# Patient Record
Sex: MALE | Race: ASIAN | NOT HISPANIC OR LATINO | URBAN - METROPOLITAN AREA
[De-identification: names, ages, dates, MRNs, and addresses within clinical notes are randomized per-mention and may not be internally consistent; named-entity substitution may affect disease eponyms.]

---

## 2021-10-28 ENCOUNTER — INPATIENT (INPATIENT)
Facility: HOSPITAL | Age: 86
LOS: 8 days | Discharge: NURSING, REHAB OR LONG-TERM CARE | DRG: 522 | End: 2021-11-06
Attending: ORTHOPAEDIC SURGERY | Admitting: ORTHOPAEDIC SURGERY
Payer: MEDICARE

## 2021-10-28 ENCOUNTER — TRANSCRIPTION ENCOUNTER (OUTPATIENT)
Age: 86
End: 2021-10-28

## 2021-10-28 VITALS
RESPIRATION RATE: 18 BRPM | TEMPERATURE: 98 F | OXYGEN SATURATION: 92 % | HEART RATE: 79 BPM | DIASTOLIC BLOOD PRESSURE: 66 MMHG | HEIGHT: 63 IN | WEIGHT: 149.91 LBS | SYSTOLIC BLOOD PRESSURE: 181 MMHG

## 2021-10-28 LAB
ALBUMIN SERPL ELPH-MCNC: 3.2 G/DL — LOW (ref 3.4–5)
ALP SERPL-CCNC: 157 U/L — HIGH (ref 40–120)
ALT FLD-CCNC: 32 U/L — SIGNIFICANT CHANGE UP (ref 12–42)
ANION GAP SERPL CALC-SCNC: 7 MMOL/L — LOW (ref 9–16)
AST SERPL-CCNC: 40 U/L — HIGH (ref 15–37)
BASOPHILS # BLD AUTO: 0.02 K/UL — SIGNIFICANT CHANGE UP (ref 0–0.2)
BASOPHILS NFR BLD AUTO: 0.2 % — SIGNIFICANT CHANGE UP (ref 0–2)
BILIRUB SERPL-MCNC: 0.6 MG/DL — SIGNIFICANT CHANGE UP (ref 0.2–1.2)
BUN SERPL-MCNC: 35 MG/DL — HIGH (ref 7–23)
CALCIUM SERPL-MCNC: 8.7 MG/DL — SIGNIFICANT CHANGE UP (ref 8.5–10.5)
CHLORIDE SERPL-SCNC: 102 MMOL/L — SIGNIFICANT CHANGE UP (ref 96–108)
CO2 SERPL-SCNC: 25 MMOL/L — SIGNIFICANT CHANGE UP (ref 22–31)
CREAT SERPL-MCNC: 1.89 MG/DL — HIGH (ref 0.5–1.3)
EOSINOPHIL # BLD AUTO: 0.03 K/UL — SIGNIFICANT CHANGE UP (ref 0–0.5)
EOSINOPHIL NFR BLD AUTO: 0.3 % — SIGNIFICANT CHANGE UP (ref 0–6)
GLUCOSE SERPL-MCNC: 155 MG/DL — HIGH (ref 70–99)
HCT VFR BLD CALC: 29.2 % — LOW (ref 39–50)
HGB BLD-MCNC: 9.7 G/DL — LOW (ref 13–17)
IMM GRANULOCYTES NFR BLD AUTO: 0.4 % — SIGNIFICANT CHANGE UP (ref 0–1.5)
LYMPHOCYTES # BLD AUTO: 0.96 K/UL — LOW (ref 1–3.3)
LYMPHOCYTES # BLD AUTO: 8.4 % — LOW (ref 13–44)
MACROCYTES BLD QL: SIGNIFICANT CHANGE UP
MANUAL SMEAR VERIFICATION: SIGNIFICANT CHANGE UP
MCHC RBC-ENTMCNC: 33.2 GM/DL — SIGNIFICANT CHANGE UP (ref 32–36)
MCHC RBC-ENTMCNC: 34.8 PG — HIGH (ref 27–34)
MCV RBC AUTO: 104.7 FL — HIGH (ref 80–100)
MONOCYTES # BLD AUTO: 0.44 K/UL — SIGNIFICANT CHANGE UP (ref 0–0.9)
MONOCYTES NFR BLD AUTO: 3.8 % — SIGNIFICANT CHANGE UP (ref 2–14)
NEUTROPHILS # BLD AUTO: 9.99 K/UL — HIGH (ref 1.8–7.4)
NEUTROPHILS NFR BLD AUTO: 86.9 % — HIGH (ref 43–77)
NRBC # BLD: 0 /100 WBCS — SIGNIFICANT CHANGE UP (ref 0–0)
PLAT MORPH BLD: NORMAL — SIGNIFICANT CHANGE UP
PLATELET # BLD AUTO: 135 K/UL — LOW (ref 150–400)
POTASSIUM SERPL-MCNC: 5 MMOL/L — SIGNIFICANT CHANGE UP (ref 3.5–5.3)
POTASSIUM SERPL-SCNC: 5 MMOL/L — SIGNIFICANT CHANGE UP (ref 3.5–5.3)
PROT SERPL-MCNC: 7.1 G/DL — SIGNIFICANT CHANGE UP (ref 6.4–8.2)
RBC # BLD: 2.79 M/UL — LOW (ref 4.2–5.8)
RBC # FLD: 12.8 % — SIGNIFICANT CHANGE UP (ref 10.3–14.5)
RBC BLD AUTO: ABNORMAL
SARS-COV-2 RNA SPEC QL NAA+PROBE: SIGNIFICANT CHANGE UP
SODIUM SERPL-SCNC: 134 MMOL/L — SIGNIFICANT CHANGE UP (ref 132–145)
WBC # BLD: 11.49 K/UL — HIGH (ref 3.8–10.5)
WBC # FLD AUTO: 11.49 K/UL — HIGH (ref 3.8–10.5)

## 2021-10-28 PROCEDURE — 72125 CT NECK SPINE W/O DYE: CPT | Mod: 26,MH

## 2021-10-28 PROCEDURE — 73552 X-RAY EXAM OF FEMUR 2/>: CPT | Mod: 26,LT

## 2021-10-28 PROCEDURE — 99285 EMERGENCY DEPT VISIT HI MDM: CPT

## 2021-10-28 PROCEDURE — 74176 CT ABD & PELVIS W/O CONTRAST: CPT | Mod: 26,MH

## 2021-10-28 PROCEDURE — 73502 X-RAY EXAM HIP UNI 2-3 VIEWS: CPT | Mod: 26,LT

## 2021-10-28 PROCEDURE — 71250 CT THORAX DX C-: CPT | Mod: 26,MH

## 2021-10-28 PROCEDURE — 70450 CT HEAD/BRAIN W/O DYE: CPT | Mod: 26,MH

## 2021-10-28 RX ORDER — ACETAMINOPHEN 500 MG
650 TABLET ORAL ONCE
Refills: 0 | Status: COMPLETED | OUTPATIENT
Start: 2021-10-28 | End: 2021-10-28

## 2021-10-28 RX ADMIN — Medication 650 MILLIGRAM(S): at 22:00

## 2021-10-28 NOTE — ED ADULT NURSE NOTE - OBJECTIVE STATEMENT
Patient to ED with L sided leg pain s/p mechanical trip and fall just prior to arrival. Daughter states she was assisting Pt as he was trying on new slippers when he lost his balance and fell onto his L side. No LOC or use of blood thinners. Daughter was unable to help Pt stand back up and she called EMS.

## 2021-10-28 NOTE — ED PROVIDER NOTE - MUSCULOSKELETAL, MLM
Tenderness to palpation to the L hip and L femur. FROM of the L hip and knee. No gross deformities. Tenderness to palpation to the L hip and L femur. L leg shortened and externally rotated.  mildly decreased ROM of L hip.  Full ROM of L knee.

## 2021-10-28 NOTE — ED PROVIDER NOTE - OBJECTIVE STATEMENT
89 y/o M with PMH of hypertension presents to the ED for L sided leg pain s/p mechanical trip and fall just prior to arrival. Daughter states she was assisting Pt as he was trying on new slippers when he lost his balance and fell onto his L side. No LOC or use of blood thinners. Daughter was unable to help Pt stand back up and she called EMS. Pt states he has "too much pain to stand on the L leg." No vomiting after the injury. Pt denies CP, SOB, or Abd pain.

## 2021-10-28 NOTE — ED PROVIDER NOTE - CLINICAL SUMMARY MEDICAL DECISION MAKING FREE TEXT BOX
89 y/o M with Hx of hypertension presents to the ED s/p mechanical trip and fall without LOC. On exam, Pt noted to have an SpO2 of 92% on room air. Lungs are clear B/L. No respiratory distress. There is Tenderness to palpation over the L hip and femur. Will check CT head and C-spine to r/o head trauma and CT chest to evaluate for unexplained hypoxia in the setting of trauma. Will check CT Abd/pelvis and XR L hip/femur. Will treat pain and reassess clinically after results have been obtained. 89 y/o M with Hx of hypertension presents to the ED s/p mechanical trip and fall without LOC. On exam, Pt noted to have an SpO2 of 92% on room air. Lungs are clear B/L. No respiratory distress. There is Tenderness to palpation over the L hip and femur. Will check CT head and C-spine to r/o head trauma and CT chest to evaluate for unexplained hypoxia in the setting of trauma. Will check CT Abd/pelvis and XR L hip/femur. Will treat pain and reassess clinically after results have been obtained.    Imaging shows L femoral neck fracture.  Pt NPO in ED.    No other acute injuries noted on CT head/ c-spine/ chest/ abd/ pelvis.  Pt now satting 95-96% on RA.    Pt is A&Ox3, able to make decisions for his own care.  In the event that pt is unable to make his own decisions, son Reynaldo (who lives in [place]) is next of kin.  I spoke with pt, his two daughters at bedside, his son Reynaldo on the phone, and his great nephew (who is an ER doctor in [place]) on FaceTime and discussed results and plan to transfer pt up to [facility] for likely surgery by ortho.  Pt is amenable to surgery.

## 2021-10-28 NOTE — ED PROVIDER NOTE - CONSTITUTIONAL, MLM
normal... Elderly male with resting tremors; Awake, alert, oriented to person, place, time/situation and in no apparent distress.

## 2021-10-28 NOTE — ED PROVIDER NOTE - ATTESTATION, MLM
110 I have reviewed and confirmed nurses' notes for patient's medications, allergies, medical history, and surgical history.

## 2021-10-29 ENCOUNTER — RESULT REVIEW (OUTPATIENT)
Age: 86
End: 2021-10-29

## 2021-10-29 LAB
APPEARANCE UR: CLEAR — SIGNIFICANT CHANGE UP
BACTERIA # UR AUTO: PRESENT /HPF
BILIRUB UR-MCNC: NEGATIVE — SIGNIFICANT CHANGE UP
BLD GP AB SCN SERPL QL: NEGATIVE — SIGNIFICANT CHANGE UP
BLD GP AB SCN SERPL QL: NEGATIVE — SIGNIFICANT CHANGE UP
COLOR SPEC: YELLOW — SIGNIFICANT CHANGE UP
COVID-19 SPIKE DOMAIN AB INTERP: POSITIVE
COVID-19 SPIKE DOMAIN ANTIBODY RESULT: >250 U/ML — HIGH
CREAT ?TM UR-MCNC: 83 MG/DL — SIGNIFICANT CHANGE UP
DIFF PNL FLD: ABNORMAL
EPI CELLS # UR: ABNORMAL /HPF (ref 0–5)
FOLATE SERPL-MCNC: >20 NG/ML — SIGNIFICANT CHANGE UP
GLUCOSE UR QL: NEGATIVE — SIGNIFICANT CHANGE UP
KETONES UR-MCNC: NEGATIVE — SIGNIFICANT CHANGE UP
LEUKOCYTE ESTERASE UR-ACNC: NEGATIVE — SIGNIFICANT CHANGE UP
NITRITE UR-MCNC: NEGATIVE — SIGNIFICANT CHANGE UP
PH UR: 6 — SIGNIFICANT CHANGE UP (ref 5–8)
PROT UR-MCNC: ABNORMAL MG/DL
RBC # BLD: 2.57 M/UL — LOW (ref 4.2–5.8)
RBC CASTS # UR COMP ASSIST: < 5 /HPF — SIGNIFICANT CHANGE UP
RETICS #: 53.2 K/UL — SIGNIFICANT CHANGE UP (ref 25–125)
RETICS/RBC NFR: 2.1 % — SIGNIFICANT CHANGE UP (ref 0.5–2.5)
RH IG SCN BLD-IMP: POSITIVE — SIGNIFICANT CHANGE UP
RH IG SCN BLD-IMP: POSITIVE — SIGNIFICANT CHANGE UP
SARS-COV-2 IGG+IGM SERPL QL IA: >250 U/ML — HIGH
SARS-COV-2 IGG+IGM SERPL QL IA: POSITIVE
SODIUM UR-SCNC: 80 MMOL/L — SIGNIFICANT CHANGE UP
SP GR SPEC: 1.02 — SIGNIFICANT CHANGE UP (ref 1–1.03)
TSH SERPL-MCNC: 1.61 UIU/ML — SIGNIFICANT CHANGE UP (ref 0.27–4.2)
UROBILINOGEN FLD QL: 0.2 E.U./DL — SIGNIFICANT CHANGE UP
VIT B12 SERPL-MCNC: >2000 PG/ML — HIGH (ref 232–1245)
WBC UR QL: < 5 /HPF — SIGNIFICANT CHANGE UP

## 2021-10-29 PROCEDURE — 93306 TTE W/DOPPLER COMPLETE: CPT | Mod: 26

## 2021-10-29 PROCEDURE — 93010 ELECTROCARDIOGRAM REPORT: CPT

## 2021-10-29 PROCEDURE — 72170 X-RAY EXAM OF PELVIS: CPT | Mod: 26

## 2021-10-29 PROCEDURE — 99221 1ST HOSP IP/OBS SF/LOW 40: CPT

## 2021-10-29 PROCEDURE — 71045 X-RAY EXAM CHEST 1 VIEW: CPT | Mod: 26

## 2021-10-29 PROCEDURE — 72170 X-RAY EXAM OF PELVIS: CPT | Mod: 26,77

## 2021-10-29 PROCEDURE — 88300 SURGICAL PATH GROSS: CPT | Mod: 26

## 2021-10-29 PROCEDURE — 99223 1ST HOSP IP/OBS HIGH 75: CPT

## 2021-10-29 RX ORDER — ATORVASTATIN CALCIUM 80 MG/1
20 TABLET, FILM COATED ORAL AT BEDTIME
Refills: 0 | Status: DISCONTINUED | OUTPATIENT
Start: 2021-10-29 | End: 2021-11-06

## 2021-10-29 RX ORDER — HYDROMORPHONE HYDROCHLORIDE 2 MG/ML
0.5 INJECTION INTRAMUSCULAR; INTRAVENOUS; SUBCUTANEOUS EVERY 6 HOURS
Refills: 0 | Status: DISCONTINUED | OUTPATIENT
Start: 2021-10-29 | End: 2021-11-02

## 2021-10-29 RX ORDER — SENNA PLUS 8.6 MG/1
2 TABLET ORAL AT BEDTIME
Refills: 0 | Status: DISCONTINUED | OUTPATIENT
Start: 2021-10-29 | End: 2021-11-06

## 2021-10-29 RX ORDER — OXYCODONE HYDROCHLORIDE 5 MG/1
5 TABLET ORAL EVERY 4 HOURS
Refills: 0 | Status: DISCONTINUED | OUTPATIENT
Start: 2021-10-29 | End: 2021-10-29

## 2021-10-29 RX ORDER — CEFAZOLIN SODIUM 1 G
2000 VIAL (EA) INJECTION EVERY 8 HOURS
Refills: 0 | Status: COMPLETED | OUTPATIENT
Start: 2021-10-30 | End: 2021-10-30

## 2021-10-29 RX ORDER — ACETAMINOPHEN 500 MG
975 TABLET ORAL EVERY 8 HOURS
Refills: 0 | Status: DISCONTINUED | OUTPATIENT
Start: 2021-10-29 | End: 2021-11-06

## 2021-10-29 RX ORDER — LANOLIN ALCOHOL/MO/W.PET/CERES
3 CREAM (GRAM) TOPICAL AT BEDTIME
Refills: 0 | Status: DISCONTINUED | OUTPATIENT
Start: 2021-10-29 | End: 2021-11-06

## 2021-10-29 RX ORDER — SODIUM CHLORIDE 9 MG/ML
1000 INJECTION, SOLUTION INTRAVENOUS
Refills: 0 | Status: DISCONTINUED | OUTPATIENT
Start: 2021-10-30 | End: 2021-10-31

## 2021-10-29 RX ORDER — PREGABALIN 225 MG/1
1 CAPSULE ORAL
Qty: 0 | Refills: 0 | DISCHARGE

## 2021-10-29 RX ORDER — FINASTERIDE 5 MG/1
5 TABLET, FILM COATED ORAL DAILY
Refills: 0 | Status: DISCONTINUED | OUTPATIENT
Start: 2021-10-29 | End: 2021-11-06

## 2021-10-29 RX ORDER — OLMESARTAN MEDOXOMIL 5 MG/1
1 TABLET, FILM COATED ORAL
Qty: 0 | Refills: 0 | DISCHARGE

## 2021-10-29 RX ORDER — PANTOPRAZOLE SODIUM 20 MG/1
1 TABLET, DELAYED RELEASE ORAL
Qty: 0 | Refills: 0 | DISCHARGE

## 2021-10-29 RX ORDER — TAMSULOSIN HYDROCHLORIDE 0.4 MG/1
0.4 CAPSULE ORAL AT BEDTIME
Refills: 0 | Status: DISCONTINUED | OUTPATIENT
Start: 2021-10-29 | End: 2021-11-06

## 2021-10-29 RX ORDER — LOSARTAN POTASSIUM 100 MG/1
100 TABLET, FILM COATED ORAL DAILY
Refills: 0 | Status: DISCONTINUED | OUTPATIENT
Start: 2021-10-29 | End: 2021-11-06

## 2021-10-29 RX ORDER — AMLODIPINE BESYLATE 2.5 MG/1
2.5 TABLET ORAL DAILY
Refills: 0 | Status: DISCONTINUED | OUTPATIENT
Start: 2021-10-29 | End: 2021-11-01

## 2021-10-29 RX ORDER — CHLORHEXIDINE GLUCONATE 213 G/1000ML
1 SOLUTION TOPICAL EVERY 12 HOURS
Refills: 0 | Status: COMPLETED | OUTPATIENT
Start: 2021-10-29 | End: 2021-10-29

## 2021-10-29 RX ORDER — ALLOPURINOL 300 MG
100 TABLET ORAL DAILY
Refills: 0 | Status: DISCONTINUED | OUTPATIENT
Start: 2021-10-29 | End: 2021-11-06

## 2021-10-29 RX ORDER — POVIDONE-IODINE 5 %
1 AEROSOL (ML) TOPICAL ONCE
Refills: 0 | Status: COMPLETED | OUTPATIENT
Start: 2021-10-29 | End: 2021-10-29

## 2021-10-29 RX ORDER — MAGNESIUM HYDROXIDE 400 MG/1
30 TABLET, CHEWABLE ORAL DAILY
Refills: 0 | Status: DISCONTINUED | OUTPATIENT
Start: 2021-10-29 | End: 2021-11-06

## 2021-10-29 RX ORDER — FOLIC ACID 0.8 MG
1 TABLET ORAL
Qty: 0 | Refills: 0 | DISCHARGE

## 2021-10-29 RX ORDER — PANTOPRAZOLE SODIUM 20 MG/1
40 TABLET, DELAYED RELEASE ORAL
Refills: 0 | Status: DISCONTINUED | OUTPATIENT
Start: 2021-10-29 | End: 2021-11-06

## 2021-10-29 RX ORDER — SODIUM CHLORIDE 9 MG/ML
1000 INJECTION INTRAMUSCULAR; INTRAVENOUS; SUBCUTANEOUS
Refills: 0 | Status: DISCONTINUED | OUTPATIENT
Start: 2021-10-29 | End: 2021-10-31

## 2021-10-29 RX ORDER — FERROUS SULFATE 325(65) MG
1 TABLET ORAL
Qty: 0 | Refills: 0 | DISCHARGE

## 2021-10-29 RX ADMIN — CHLORHEXIDINE GLUCONATE 1 APPLICATION(S): 213 SOLUTION TOPICAL at 06:37

## 2021-10-29 RX ADMIN — CHLORHEXIDINE GLUCONATE 1 APPLICATION(S): 213 SOLUTION TOPICAL at 16:37

## 2021-10-29 RX ADMIN — Medication 1 APPLICATION(S): at 16:34

## 2021-10-29 RX ADMIN — LOSARTAN POTASSIUM 100 MILLIGRAM(S): 100 TABLET, FILM COATED ORAL at 06:31

## 2021-10-29 RX ADMIN — PANTOPRAZOLE SODIUM 40 MILLIGRAM(S): 20 TABLET, DELAYED RELEASE ORAL at 06:31

## 2021-10-29 RX ADMIN — SODIUM CHLORIDE 120 MILLILITER(S): 9 INJECTION INTRAMUSCULAR; INTRAVENOUS; SUBCUTANEOUS at 01:23

## 2021-10-29 RX ADMIN — AMLODIPINE BESYLATE 2.5 MILLIGRAM(S): 2.5 TABLET ORAL at 06:31

## 2021-10-29 NOTE — CONSULT NOTE ADULT - ASSESSMENT
89 M with HTN, CKD, BPH, gout admitted for mechanical fall with L hip fracture. Medicine consulted for pre-operative assessment.    #L hip fracture  No LOC or prodrome suggestive of cardiac or neurologic etiology for fall, probably mechanical. Patient completes 2 METS at home at baseline, has tolerated surgery in the past. May have some cardiac sequelae of longstanding HTN but no overt signs of heart failure.  - Patient is optimized for surgery with close PCP follow-up    #HTN  181/66 improved to 166/64, wise pulse pressure likely correlating with aortic calcifications. Per daughter, he "is not a complainer" so likely some element of pain.  - c/w amlodipine 2.5mg daily, if hypertensive post-op with adequate pain control can increase to 5mg vs temporize with hydralazine 10mg PO once for SBP>180  - outpt follow-up of cardiomegaly    #CKD  Cr 1.89, borderline SWAPNIL on CKD status but not meeting criteria at this time. UA without casts, SG 1.02 less suggestive of prerenal SWAPNIL. FENa 1.4% suggestive of baseline CKD.  - caution with use of CT w/ IV contrast  - OK to use gentle maintenance fluids if pt is NPO, but avoid large volume bolus (1L or greater) unless hemodynamically unstable    #Anemia  Hgb 9.7, may be baseline, but per daughter no Hx of anemia and on no supplements. Macrocytic and hypoproliferative suggest B12, folate, TSH, alcohol, or MDS. Daughter reports no EtOH.  - f/u B12, folate, TSH  - If otherwise negative can f/u outpatient  - maintain active T&S, transfuse for Hgb<7.0 or Hgb<9 with hemodynamic changes and suspicion of active bleeding    Medicine comanagement will follow. Recs not finalized until attending attestation below 89 M with HTN, CKD, BPH, gout admitted for mechanical fall with L hip fracture. Medicine consulted for pre-operative assessment.    #L hip fracture  No LOC or prodrome suggestive of cardiac or neurologic etiology for fall, probably mechanical. Patient completes 2 METS at home at baseline, has tolerated surgery in the past. May have some cardiac sequelae of longstanding HTN but no overt signs of heart failure.  - Recommend cardiology consultation before patient can be fully optimized for surgery  - RCRI 0  - Maciel 1.1% risk    #HTN  181/66 improved to 166/64, wise pulse pressure likely correlating with aortic calcifications. Per daughter, he "is not a complainer" so likely some element of pain.  - c/w amlodipine 2.5mg daily, if hypertensive post-op with adequate pain control can increase to 5mg vs temporize with hydralazine 10mg PO once for SBP>180  - recommend cardiology consult to determine if echocardiogram is indicated given CT evidence of pHTN, cardiomegaly, and RBBB on EKG.    #CKD  Cr 1.89, borderline SWAPNIL on CKD status but not meeting criteria at this time. UA without casts, SG 1.02 less suggestive of prerenal SWAPNIL. FENa 1.4% suggestive of baseline CKD.  - caution with use of CT w/ IV contrast  - OK to use gentle maintenance fluids if pt is NPO, but avoid large volume bolus (1L or greater) unless hemodynamically unstable    #Anemia  Hgb 9.7, may be baseline, but per daughter no Hx of anemia and on no supplements. Macrocytic and hypoproliferative suggest B12, folate, TSH, alcohol, or MDS. Daughter reports no EtOH.  - f/u B12, folate, TSH  - iron panel, TIBC, ferritin for next routine labs  - If otherwise negative can f/u outpatient  - maintain active T&S, transfuse for Hgb<7.0 or Hgb<9 with hemodynamic changes and suspicion of active bleeding    Medicine comanagement will follow. Recs not finalized until attending attestation below

## 2021-10-29 NOTE — CONSULT NOTE ADULT - ATTENDING COMMENTS
Patient seen and examined in AM. Daughters at bedside serving as Cantonese .    89M w h/o CKD (1.6), BPH, HTN, Gout, cholelithiasis w procedures at [facility] previously, p/w mechanical fall onto L side found to have L FNF - for OR w Dr. Alicea - co-management called for pre-op evaluation    Pt ambulates using a cane at baseline in his apartment. Lives w daughter and has part time HHA who prepares breakfast and lunch. Pt has not gone outside since pandemic began except to doctor's visits (uses WC then). Pt otherwise is A/O x3 at baseline and reads the Chinese newspaper daily. Denies h/o cardiac history, CVA, DM. Exam shows male in NAD on RA, RRR, CTAB wo rales, rhonchi, wheezing, abd soft, NABS, non-tender, Decreased strength in L hip and knee 2/2 pain.     #Pre-op evaluation - RCRI Class I risk; SIMONS 1.1% risk of ALLISON. METS: 2  EKG:   #HTN - above target. Suspect pain from trauma contributing  #CKDIII - Cr 1.89 (1.6 baseline)  #HLD - c/w home statin  #Macrocytic anemia - 9.7  #BPH - on finasteride and flomax at home    Recommendations  Discussed with daughters that based on patient's baseline function status, he is likely going to need sub acute rehab after surgery. They also understand cardiology to is evaluate prior to surgery.  -Pt is intermediate risk for intermediate risk procedure. Due to METS <4 agree with cardiology eval to complete pre-operative evaluation  -Caution w NSAIDs post op due to CKD  -Once farmer removed would perform q6h bladder scans in setting of BPH  -F/u B12, Folate, TSH    DISPO: TBD pending cardiac pre-op evaluation and possible OR. Patient seen and examined in AM. Daughters at bedside serving as Cantonese .    89M w h/o CKD (1.6), BPH, HTN, Gout, cholelithiasis w procedures at [facility] previously, p/w mechanical fall onto L side found to have L FNF - for OR w Dr. Alicea - co-management called for pre-op evaluation    Pt ambulates using a cane at baseline in his apartment. Lives w daughter and has part time HHA who prepares breakfast and lunch. Pt has not gone outside since pandemic began except to doctor's visits (uses WC then). Pt otherwise is A/O x3 at baseline and reads the Chinese newspaper daily. Denies h/o cardiac history, CVA, DM. Exam shows male in NAD on RA, RRR, CTAB wo rales, rhonchi, wheezing, abd soft, NABS, non-tender, Decreased strength in L hip and knee 2/2 pain.     #Pre-op evaluation - RCRI Class I risk; SIMONS 1.1% risk of ALLISON. METS: 2  #HTN - above target. Suspect pain from trauma contributing  #CKDIII - Cr 1.89 (1.6 baseline)  #HLD - c/w home statin  #Macrocytic anemia - 9.7  #BPH - on finasteride and flomax at home    Recommendations  Discussed with daughters that based on patient's baseline function status, he is likely going to need sub acute rehab after surgery. They also understand cardiology to is evaluate prior to surgery.  -Pt is intermediate risk for intermediate risk procedure. Due to METS <4 agree with cardiology eval to complete pre-operative evaluation  -Caution w NSAIDs post op due to CKD  -Once farmer removed would perform q6h bladder scans in setting of BPH  -F/u B12, Folate, TSH    DISPO: TBD pending cardiac pre-op evaluation and possible OR.

## 2021-10-29 NOTE — H&P ADULT - NSHPLABSRESULTS_GEN_ALL_CORE
Vital Signs Last 24 Hrs  T(C): 37.2 (28 Oct 2021 23:15), Max: 37.2 (28 Oct 2021 22:12)  T(F): 98.9 (28 Oct 2021 23:15), Max: 99 (28 Oct 2021 22:12)  HR: 89 (28 Oct 2021 23:15) (79 - 94)  BP: 166/64 (28 Oct 2021 23:15) (166/64 - 181/66)  BP(mean): --  RR: 18 (28 Oct 2021 23:15) (18 - 18)  SpO2: 92% (28 Oct 2021 23:15) (92% - 95%)      Labs:                        9.7    11.49 )-----------( 135      ( 28 Oct 2021 17:31 )             29.2     10-28    134  |  102  |  35<H>  ----------------------------<  155<H>  5.0   |  25  |  1.89<H>    Ca    8.7      28 Oct 2021 17:31    TPro  7.1  /  Alb  3.2<L>  /  TBili  0.6  /  DBili  x   /  AST  40<H>  /  ALT  32  /  AlkPhos  157<H>  10-28        Imaging:     Xray L hip: L completely displaced femoral neck fracture

## 2021-10-29 NOTE — CONSULT NOTE ADULT - SUBJECTIVE AND OBJECTIVE BOX
89 M with PMHx HTN, BPH, CKD with b/l Cr 1.6 presented with daughter to [facility] 10/28 s/p mechanical fall while trying on slippers without LOC. Patient was in his usual state of health without prodrome; but had weakness after fall and was unable to sit up with assistance of daughter so brought to ED.    ED Course: 36.8, 79, 181/66, 18, 92% RA  Tylenol 650mg  XR: completely displaced L femoral neck fracture per ortho service  CXR:   Admit to orthopedic service for  89 M with PMHx HTN, BPH, gout, CKD with b/l Cr 1.6 presented with daughter to [facility] 10/28 s/p mechanical fall while trying on slippers without LOC. Patient was in his usual state of health without prodrome; but had weakness after fall and was unable to sit up with assistance of daughter so brought to ED.    ED Course: 36.8, 79, 181/66, 18, 92% RA  Tylenol 650mg  Labs notable for WBC 11.5, Hgb 9.7 with .7 with RPI 1.5%  XR: completely displaced L femoral neck fracture per ortho service  CXR: unremarkable  CT C/A/P: cardiomegaly, aortic calcification, pulmonary artery dilation  Admit to orthopedic service for operative management of L hip fracture    Further history per daughter at bedside: in usual state of health prior to fall, had slight head trauma but primarily landed on hip. At baseline A&Ox3, ambulates with cane in the home and wheelchair when out of the house. Baseline urinary frequency with low output volumes on tamsulosin and finasteride. Takes amlodipine, olmesartan, atorvastatin at home. Also takes Chinese herbs every day, one of which is similar to but not the same as Gingko.  SHx notable for a colon surgery 40 years ago in the [place], daughter unsure what it was for. He also had ERCP to retrieve a gallstone 3/2020 and a few days later needed a laparoscopic surgery (NOT cholecystectomy) that she thinks might be a lysis of adhesions. He was fully anesthetized and she thinks he was intubated for it but wasn't sure; no complications. Last gout flare several years ago.  No bleeding or clotting Hx, no cardiac history. His PCP was incidentally a cardiologist prior to his current PCP (Jillian Wetzel [phone]). Had TTE done many years ago and was normal, nothing done recently.    Per primary team, grandson (daughter at bedside's nephew) is ED physician who gave med list and reports baseline Cr is 1.6    Per daughter: ROS positive for hip pain, 12-point ROS otherwise negative    VITAL SIGNS:  T(F): 98.9 (10-28-21 @ 23:15)  HR: 89 (10-28-21 @ 23:15)  BP: 166/64 (10-28-21 @ 23:15)  RR: 18 (10-28-21 @ 23:15)  SpO2: 92% (10-28-21 @ 23:15)  Wt(kg): --      10-28-21 @ 07:01  -  10-29-21 @ 03:53  --------------------------------------------------------  IN: 600 mL / OUT: 150 mL / NET: 450 mL        PHYSICAL EXAM:    Constitutional: elderly man resting comfortably in bed; NAD  HEENT: NC/AT, PER, anicteric sclera, no nasal discharge; MMM; missing several teeth  Respiratory: CTA B/L; no W/R/R, no retractions  Cardiac: +S1/S2; RRR; no M/R/G; +S4 heard, 1 PVC noted during 15s auscultation  Gastrointestinal: soft, NT/ND; no rebound or guarding  Extremities: WWP, no clubbing or cyanosis; no peripheral edema  Vascular: 2+ radial, DP/PT pulses B/L  Dermatologic: skin warm, dry and intact; no rashes, wounds, or scars  Neurologic: AAOx3 per daughter; 5/5 strength b/l arms with slight intentional tremor; sensation intact in b/l legs  Psychiatric: unable to obtain    MEDICATIONS  (STANDING):  allopurinol 100 milliGRAM(s) Oral daily  amLODIPine   Tablet 2.5 milliGRAM(s) Oral daily  atorvastatin 20 milliGRAM(s) Oral at bedtime  chlorhexidine 2% Cloths 1 Application(s) Topical every 12 hours  finasteride 5 milliGRAM(s) Oral daily  losartan 100 milliGRAM(s) Oral daily  pantoprazole    Tablet 40 milliGRAM(s) Oral before breakfast  povidone iodine 5% Nasal Swab 1 Application(s) Both Nostrils once  senna 2 Tablet(s) Oral at bedtime  sodium chloride 0.9%. 1000 milliLiter(s) (120 mL/Hr) IV Continuous <Continuous>  tamsulosin 0.4 milliGRAM(s) Oral at bedtime    MEDICATIONS  (PRN):  magnesium hydroxide Suspension 30 milliLiter(s) Oral daily PRN Constipation  melatonin 3 milliGRAM(s) Oral at bedtime PRN Insomnia  oxyCODONE    IR 5 milliGRAM(s) Oral every 4 hours PRN Moderate Pain (4 - 6)      Allergies    No Known Allergies    Intolerances        LABS:                        9.7    11.49 )-----------( 135      ( 28 Oct 2021 17:31 )             29.2     10-28    134  |  102  |  35<H>  ----------------------------<  155<H>  5.0   |  25  |  1.89<H>    Ca    8.7      28 Oct 2021 17:31    TPro  7.1  /  Alb  3.2<L>  /  TBili  0.6  /  DBili  x   /  AST  40<H>  /  ALT  32  /  AlkPhos  157<H>  10-28      Urinalysis Basic - ( 29 Oct 2021 01:33 )    Color: Yellow / Appearance: Clear / S.020 / pH: x  Gluc: x / Ketone: NEGATIVE  / Bili: Negative / Urobili: 0.2 E.U./dL   Blood: x / Protein: Trace mg/dL / Nitrite: NEGATIVE   Leuk Esterase: NEGATIVE / RBC: < 5 /HPF / WBC < 5 /HPF   Sq Epi: x / Non Sq Epi: 5-10 /HPF / Bacteria: Present /HPF        RADIOLOGY & ADDITIONAL TESTS:  Reviewed

## 2021-10-29 NOTE — CONSULT NOTE ADULT - ATTENDING COMMENTS
Initial attending contact date  10/29/21    . See fellow note written above for details. I reviewed the fellow documentation. I have personally seen and examined this patient. I reviewed vitals, labs, medications, cardiac studies, and additional imaging. I agree with the above fellow's findings and plans as written above with the following additions/statements.       89M w/HTN, CKD, BPH, gout, possible TIA per the family adm to ortho on 10/29 for mechanical fall w/L hip fracture. Cardiology consulted for preop assessment.    -Pt with limited functional capacity but denies anginal equivalents  -EKG NSR without ischemic changes  -On exam with 3/6 JAQUELINE suggestive of moderate AS  -Would obtain echo for eval, but does not have to be done prior to OR  -Pt considered intermediate risk for intermediate risk procedure  -Will fu post op

## 2021-10-29 NOTE — CONSULT NOTE ADULT - SUBJECTIVE AND OBJECTIVE BOX
HPI:  Orthopaedic Surgery Consult Note    Attending Physician: Dr. Alicea  Consult requested by: ED    CC: L hip pain    HPI:  89yMale with HTN and CKD (baseline Cr 1.6) who presents with a L femoral neck fracture status post mechanical fall. Pt slipped while trying on slippers and landed on his left side. Denies LOC, head strike. Denies using blood thinners. Pt was transferred from [facility] for preoperative evaluation. Denies numbness/tingling of extremities.               (29 Oct 2021 01:02)    The patient was admitted to . The patient was seen and examined at bedside.    ROS: A 10-point review of systems was otherwise negative.    PAST MEDICAL & SURGICAL HISTORY:  HTN (hypertension)    Chronic kidney disease, unspecified CKD stage        SOCIAL HISTORY:  FAMILY HISTORY:      ALLERGIES: 	  No Known Allergies            MEDICATIONS:  allopurinol 100 milliGRAM(s) Oral daily  amLODIPine   Tablet 2.5 milliGRAM(s) Oral daily  atorvastatin 20 milliGRAM(s) Oral at bedtime  chlorhexidine 2% Cloths 1 Application(s) Topical every 12 hours  finasteride 5 milliGRAM(s) Oral daily  losartan 100 milliGRAM(s) Oral daily  magnesium hydroxide Suspension 30 milliLiter(s) Oral daily PRN  melatonin 3 milliGRAM(s) Oral at bedtime PRN  oxyCODONE    IR 5 milliGRAM(s) Oral every 4 hours PRN  pantoprazole    Tablet 40 milliGRAM(s) Oral before breakfast  povidone iodine 5% Nasal Swab 1 Application(s) Both Nostrils once  senna 2 Tablet(s) Oral at bedtime  sodium chloride 0.9%. 1000 milliLiter(s) IV Continuous <Continuous>  tamsulosin 0.4 milliGRAM(s) Oral at bedtime      PHYSICAL EXAM:  T(C): 37.1 (10-29-21 @ 04:30), Max: 37.2 (10-28-21 @ 22:12)  HR: 76 (10-29-21 @ 04:30) (76 - 94)  BP: 166/61 (10-29-21 @ 04:30) (166/61 - 181/66)  RR: 15 (10-29-21 @ 04:30) (15 - 18)  SpO2: 93% (10-29-21 @ 04:30) (92% - 95%)  Wt(kg): --    10-28-21 @ 07:01  -  10-29-21 @ 07:00  --------------------------------------------------------  IN: 600 mL / OUT: 150 mL / NET: 450 mL        GEN: Awake, comfortable. NAD.   HEENT: NCAT, PERRL, EOMI. Mucosa moist.   NECK: Supple, no JVD.   RESP: CTA b/l  CV: RRR, normal s1/s2. No m/r/g.  ABD: Soft, NTND. BS+  EXT: Warm. No edema, clubbing, or cyanosis.   NEURO: AAOx3. No focal deficits.    I&O's Summary    28 Oct 2021 07:01  -  29 Oct 2021 07:00  --------------------------------------------------------  IN: 600 mL / OUT: 150 mL / NET: 450 mL      Height (cm): 160 (10-29 @ 04:03)  Weight (kg): 68 (10-29 @ 04:03)  BMI (kg/m2): 26.6 (10-29 @ 04:03)  BSA (m2): 1.71 (10-29 @ 04:03)  	  LABS:	 	    CARDIAC MARKERS:                                  9.7    11.49 )-----------( 135      ( 28 Oct 2021 17:31 )             29.2     10-28    134  |  102  |  35<H>  ----------------------------<  155<H>  5.0   |  25  |  1.89<H>    Ca    8.7      28 Oct 2021 17:31    TPro  7.1  /  Alb  3.2<L>  /  TBili  0.6  /  DBili  x   /  AST  40<H>  /  ALT  32  /  AlkPhos  157<H>  10-28    proBNP:   Lipid Profile:   HgA1c:   TSH: Thyroid Stimulating Hormone, Serum: 1.610 uIU/mL (10-29 @ 02:11)      TELEMETRY: 	    ECG:  	  RADIOLOGY:   ECHO:  STRESS:  CATH:   HPI:  Orthopaedic Surgery Consult Note    Attending Physician: Dr. Alicea  Consult requested by: ED    CC: L hip pain    HPI:  89yMale with HTN and CKD (baseline Cr 1.6) who presents with a L femoral neck fracture status post mechanical fall. Pt slipped while trying on slippers and landed on his left side. Denies LOC, head strike. Denies using blood thinners. Pt was transferred from [facility] for preoperative evaluation. Denies numbness/tingling of extremities.               (29 Oct 2021 01:02)    The patient was admitted to [facility] for management of L hip fracture. Cardiology consulted for preop evaluation The patient was seen and examined at bedside with his daughters at bedside. States he lives with them and is able to walk around the house without any chest pain or SOB. Does not really leave the house. No complaints of chest pain, paliptations or dyspnea. No LE swelling. Has undergone two surgical procedures in the last 1.5 years without any issue    ROS: A 10-point review of systems was otherwise negative.    PAST MEDICAL & SURGICAL HISTORY:  HTN (hypertension)    Chronic kidney disease, unspecified CKD stage        SOCIAL HISTORY:  FAMILY HISTORY:      ALLERGIES: 	  No Known Allergies            MEDICATIONS:  allopurinol 100 milliGRAM(s) Oral daily  amLODIPine   Tablet 2.5 milliGRAM(s) Oral daily  atorvastatin 20 milliGRAM(s) Oral at bedtime  chlorhexidine 2% Cloths 1 Application(s) Topical every 12 hours  finasteride 5 milliGRAM(s) Oral daily  losartan 100 milliGRAM(s) Oral daily  magnesium hydroxide Suspension 30 milliLiter(s) Oral daily PRN  melatonin 3 milliGRAM(s) Oral at bedtime PRN  oxyCODONE    IR 5 milliGRAM(s) Oral every 4 hours PRN  pantoprazole    Tablet 40 milliGRAM(s) Oral before breakfast  povidone iodine 5% Nasal Swab 1 Application(s) Both Nostrils once  senna 2 Tablet(s) Oral at bedtime  sodium chloride 0.9%. 1000 milliLiter(s) IV Continuous <Continuous>  tamsulosin 0.4 milliGRAM(s) Oral at bedtime      PHYSICAL EXAM:  T(C): 37.1 (10-29-21 @ 04:30), Max: 37.2 (10-28-21 @ 22:12)  HR: 76 (10-29-21 @ 04:30) (76 - 94)  BP: 166/61 (10-29-21 @ 04:30) (166/61 - 181/66)  RR: 15 (10-29-21 @ 04:30) (15 - 18)  SpO2: 93% (10-29-21 @ 04:30) (92% - 95%)  Wt(kg): --    10-28-21 @ 07:01  -  10-29-21 @ 07:00  --------------------------------------------------------  IN: 600 mL / OUT: 150 mL / NET: 450 mL        GEN: Awake, comfortable. NAD.   HEENT: NCAT, PERRL, EOMI. Mucosa moist.   NECK: Supple, no JVD.   RESP: CTA b/l  CV: RRR, normal s1/s2. 3/6 JAQUELINE w/radiation to carotids and axilla.  ABD: Soft, NTND. BS+  EXT: Warm. No edema, clubbing, or cyanosis.   NEURO: AAOx3. No focal deficits.    I&O's Summary    28 Oct 2021 07:01  -  29 Oct 2021 07:00  --------------------------------------------------------  IN: 600 mL / OUT: 150 mL / NET: 450 mL      Height (cm): 160 (10-29 @ 04:03)  Weight (kg): 68 (10-29 @ 04:03)  BMI (kg/m2): 26.6 (10-29 @ 04:03)  BSA (m2): 1.71 (10-29 @ 04:03)  	  LABS:	 	    CARDIAC MARKERS:                                  9.7    11.49 )-----------( 135      ( 28 Oct 2021 17:31 )             29.2     10-28    134  |  102  |  35<H>  ----------------------------<  155<H>  5.0   |  25  |  1.89<H>    Ca    8.7      28 Oct 2021 17:31    TPro  7.1  /  Alb  3.2<L>  /  TBili  0.6  /  DBili  x   /  AST  40<H>  /  ALT  32  /  AlkPhos  157<H>  10-28    proBNP:   Lipid Profile:   HgA1c:   TSH: Thyroid Stimulating Hormone, Serum: 1.610 uIU/mL (10-29 @ 02:11)      TELEMETRY: 	    ECG:  	  RADIOLOGY:   ECHO:  STRESS:  CATH:

## 2021-10-29 NOTE — CONSULT NOTE ADULT - ASSESSMENT
A/P: 89M w/HTN, CKD, BPH, gout, possible TIA per the vamily adm to ortho on 10/29 for mechanical fall w/L hip fracture. Cardiology consulted for preop assessment.    #Preop - RCRI - 0-1 (depending on stroke history) w/METS < 4. Maciel - 1.07% chance of perioperative MI or cardiac arrest.  - no further cardiac testing required prior to planned surgery    #Murmur  - obtain TTE; does not need to be done prior to the procedure.    Recommendations are final when signed by attending.    --  Alex Lopez MD  Cardiology PGY6

## 2021-10-29 NOTE — H&P ADULT - NSHPPHYSICALEXAM_GEN_ALL_CORE
Physical Exam:  General: Pt Alert and oriented, NAD  LLE shortened and externally rotated, skin intact  Pulses: 2+dp, pt pulses, toes wwp, cap refill <3 seconds  Sensation: SILT sural/saph/sp/dp/ tibial distributions  Motor: EHL/FHL/TA/GS 5/5

## 2021-10-29 NOTE — H&P ADULT - ASSESSMENT
1/30/2019      Re:   Etelvina Fair  [address] [address]                        This is to certify that Etelvina Fair was seen at [facility].      RESTRICTIONS: Off of work 1-30-19, and 1-31-19.          SIGNATURE:___________________________________________,   1/30/2019      Moy Hanson MD          [facility]  [address] [id number]    
A/P: 89yMale with HTN, CKD (baseline Cr 1.6) who presents with L FNF pending OR for L hip martina.  - stable  - pain control  - pre op labs  - CXR, EKG  - medicine clearance  - NPO for OR today  - consented  - PT, WBS: NWB  - Dispo: pending OR  - Discussed with Attending, Dr. Nixon Lemos, PGY-1  Ortho Pager [phone]

## 2021-10-29 NOTE — PROGRESS NOTE ADULT - SUBJECTIVE AND OBJECTIVE BOX
Ortho Note    Subjective:  Pt comfortable without complaints. Interpretation provided by daughter at bedside. Patient NPO for OR.   Denies CP, SOB, N/V, numbness/tingling     Vital Signs Last 24 Hrs  T(C): 37 (10-29-21 @ 13:59), Max: 37.1 (10-29-21 @ 10:00)  T(F): 98.6 (10-29-21 @ 13:59), Max: 98.8 (10-29-21 @ 10:00)  HR: 92 (10-29-21 @ 13:59) (82 - 92)  BP: 153/66 (10-29-21 @ 13:59) (153/66 - 160/60)  BP(mean): --  RR: 20 (10-29-21 @ 13:59) (17 - 20)  SpO2: 94% (10-29-21 @ 13:59) (93% - 94%)  AVSS    Objective:    Physical Exam:  General: Pt Alert and oriented, NAD  DSG C/D/I  Pulses:  Sensation: silt intact bialteral lower extremities  Motor: EHL/FHL/TA/GS- firing        Plan of Care:  A/P: 89yMale s/p Left mechanical fall with garden 4 FNF, for OR today for possible left hip martina   - afebrile, nontoxic appearance  - Pain Control- oxycodone 5mg PO Q4h Prn moderate pain   - DVT ppx: held for OR  - PT, WBS: bedrest for OR  - appreciate medicine recs  - appreciate cardiology recs  - NPo for OR   - NS @ 120ml/hour  - echo as per cardiolgoy recs  - DIspo- OR pending medical and cardiac clearance for Left Hip martina    Ortho Pager [phone]

## 2021-10-29 NOTE — H&P ADULT - ATTENDING COMMENTS
Contacted by Dr. Alicea to take over care of the patient  Pt. seen/ examined  No additional injuries  Discussed risks/ benefits/ alternatives of surgery with patient/ family  Consent obtained, proceeded to OR for an uneventful L hip hemiarthroplasty

## 2021-10-29 NOTE — H&P ADULT - HISTORY OF PRESENT ILLNESS
Orthopaedic Surgery Consult Note    Attending Physician: Dr. Alicea  Consult requested by: ED    CC: L hip pain    HPI:  89yMale with HTN and CKD (baseline Cr 1.6) who presents with a L femoral neck fracture status post mechanical fall. Pt slipped while trying on slippers and landed on his left side. Denies LOC, head strike. Denies using blood thinners. Pt was transferred from [facility] for preoperative evaluation. Denies numbness/tingling of extremities.

## 2021-10-30 LAB
ANION GAP SERPL CALC-SCNC: 7 MMOL/L — SIGNIFICANT CHANGE UP (ref 5–17)
BUN SERPL-MCNC: 30 MG/DL — HIGH (ref 7–23)
CALCIUM SERPL-MCNC: 8.2 MG/DL — LOW (ref 8.4–10.5)
CHLORIDE SERPL-SCNC: 104 MMOL/L — SIGNIFICANT CHANGE UP (ref 96–108)
CO2 SERPL-SCNC: 23 MMOL/L — SIGNIFICANT CHANGE UP (ref 22–31)
CREAT SERPL-MCNC: 1.81 MG/DL — HIGH (ref 0.5–1.3)
GLUCOSE SERPL-MCNC: 154 MG/DL — HIGH (ref 70–99)
HCT VFR BLD CALC: 22.4 % — LOW (ref 39–50)
HCT VFR BLD CALC: 25.1 % — LOW (ref 39–50)
HGB BLD-MCNC: 7.4 G/DL — LOW (ref 13–17)
HGB BLD-MCNC: 8.3 G/DL — LOW (ref 13–17)
MCHC RBC-ENTMCNC: 32.9 PG — SIGNIFICANT CHANGE UP (ref 27–34)
MCHC RBC-ENTMCNC: 33 GM/DL — SIGNIFICANT CHANGE UP (ref 32–36)
MCHC RBC-ENTMCNC: 33.1 GM/DL — SIGNIFICANT CHANGE UP (ref 32–36)
MCHC RBC-ENTMCNC: 34.9 PG — HIGH (ref 27–34)
MCV RBC AUTO: 105.7 FL — HIGH (ref 80–100)
MCV RBC AUTO: 99.6 FL — SIGNIFICANT CHANGE UP (ref 80–100)
NRBC # BLD: 0 /100 WBCS — SIGNIFICANT CHANGE UP (ref 0–0)
NRBC # BLD: 0 /100 WBCS — SIGNIFICANT CHANGE UP (ref 0–0)
PLATELET # BLD AUTO: 102 K/UL — LOW (ref 150–400)
PLATELET # BLD AUTO: 96 K/UL — LOW (ref 150–400)
POTASSIUM SERPL-MCNC: 4.8 MMOL/L — SIGNIFICANT CHANGE UP (ref 3.5–5.3)
POTASSIUM SERPL-SCNC: 4.8 MMOL/L — SIGNIFICANT CHANGE UP (ref 3.5–5.3)
RBC # BLD: 2.12 M/UL — LOW (ref 4.2–5.8)
RBC # BLD: 2.52 M/UL — LOW (ref 4.2–5.8)
RBC # FLD: 13 % — SIGNIFICANT CHANGE UP (ref 10.3–14.5)
RBC # FLD: 15.1 % — HIGH (ref 10.3–14.5)
SODIUM SERPL-SCNC: 134 MMOL/L — LOW (ref 135–145)
WBC # BLD: 10.96 K/UL — HIGH (ref 3.8–10.5)
WBC # BLD: 9.74 K/UL — SIGNIFICANT CHANGE UP (ref 3.8–10.5)
WBC # FLD AUTO: 10.96 K/UL — HIGH (ref 3.8–10.5)
WBC # FLD AUTO: 9.74 K/UL — SIGNIFICANT CHANGE UP (ref 3.8–10.5)

## 2021-10-30 PROCEDURE — 99233 SBSQ HOSP IP/OBS HIGH 50: CPT

## 2021-10-30 RX ORDER — ASPIRIN/CALCIUM CARB/MAGNESIUM 324 MG
325 TABLET ORAL
Refills: 0 | Status: DISCONTINUED | OUTPATIENT
Start: 2021-10-30 | End: 2021-11-06

## 2021-10-30 RX ADMIN — SODIUM CHLORIDE 100 MILLILITER(S): 9 INJECTION, SOLUTION INTRAVENOUS at 07:23

## 2021-10-30 RX ADMIN — Medication 100 MILLIGRAM(S): at 22:09

## 2021-10-30 RX ADMIN — Medication 975 MILLIGRAM(S): at 22:08

## 2021-10-30 RX ADMIN — Medication 975 MILLIGRAM(S): at 23:00

## 2021-10-30 RX ADMIN — Medication 975 MILLIGRAM(S): at 15:31

## 2021-10-30 RX ADMIN — Medication 975 MILLIGRAM(S): at 09:08

## 2021-10-30 RX ADMIN — PANTOPRAZOLE SODIUM 40 MILLIGRAM(S): 20 TABLET, DELAYED RELEASE ORAL at 07:23

## 2021-10-30 RX ADMIN — Medication 975 MILLIGRAM(S): at 07:20

## 2021-10-30 RX ADMIN — Medication 100 MILLIGRAM(S): at 07:12

## 2021-10-30 RX ADMIN — LOSARTAN POTASSIUM 100 MILLIGRAM(S): 100 TABLET, FILM COATED ORAL at 07:20

## 2021-10-30 RX ADMIN — AMLODIPINE BESYLATE 2.5 MILLIGRAM(S): 2.5 TABLET ORAL at 07:22

## 2021-10-30 RX ADMIN — ATORVASTATIN CALCIUM 20 MILLIGRAM(S): 80 TABLET, FILM COATED ORAL at 22:08

## 2021-10-30 RX ADMIN — Medication 100 MILLIGRAM(S): at 15:00

## 2021-10-30 RX ADMIN — SENNA PLUS 2 TABLET(S): 8.6 TABLET ORAL at 22:09

## 2021-10-30 RX ADMIN — TAMSULOSIN HYDROCHLORIDE 0.4 MILLIGRAM(S): 0.4 CAPSULE ORAL at 22:08

## 2021-10-30 RX ADMIN — Medication 325 MILLIGRAM(S): at 15:00

## 2021-10-30 RX ADMIN — Medication 975 MILLIGRAM(S): at 15:00

## 2021-10-30 NOTE — PROGRESS NOTE ADULT - ASSESSMENT
89M w h/o CKD (1.6), BPH, HTN, Gout, cholelithiasis w procedures at [facility] previously, p/w mechanical fall onto L side found to have L FNF - s/p L Hemiarthroplasty 10/29 w Dr. Mayorga    #Post-op state. Pain controlled. On  BID for ppx. On bowel regimen and incentive spirometer  #HTN - 130-160s. Continue home BP regimen  #Blood loss anemia - 7.4 from 9.7 on baseline. See below  #Thrombocytopenia - 102 from 135. Continue to monitor  #hyponatremia - 134 - stable.  #CKDIII - Cr stable 1.1 from 1.89 (1.6 baseline)  #HLD - c/w home statin  #BPH - on finasteride and flomax at home    Recommendations  Overall was able to walk to door w PT today wo symptoms. Discussed HPT vs [facility]EL w family - noted this will depend on patient progression and level of assistance pt will need.   -Agree w 1u RBC this AM. Likely OR and losses from traumatic injury. Pt does not appear symptomatic  -Due to decreased UOP would continue IVF w NS in setting of hyponatremia.  -Caution w NSAIDs post op due to CKD  -Once farmer removed would perform q6h bladder scans in setting of BPH    DISPO: [facility] vs HPT pending progress. Anticipate DC Mon-Tue

## 2021-10-30 NOTE — PHYSICAL THERAPY INITIAL EVALUATION ADULT - GAIT DEVIATIONS NOTED, PT EVAL
decreased arleen/increased time in double stance/decreased step length/decreased stride length/decreased weight-shifting ability

## 2021-10-30 NOTE — PROGRESS NOTE ADULT - SUBJECTIVE AND OBJECTIVE BOX
Ortho Note    Pt seen and examined at bedside this AM, comfortable without complaints, pain controlled. Denies CP, SOB, N/V, numbness/tingling     Vital Signs Last 24 Hrs  T(C): --  T(F): --  HR: --  BP: --  BP(mean): --  RR: --  SpO2: --  AVSS    General: Pt Alert and oriented, NAD  L hip aquacell DSG C/D/I  Sensation intact s/s/sp/dp/t and symmetric   Motor: EHL/FHL/TA/GS firing   2+ DP pulse  Toes WWP      A/P: 89yMale s/p L hip martina 10/29  - Stable  - Pain Control  - DVT ppx: ASA  - PT, WBS: WBAT  - Low Hgb, transfused 1 unit pRBC  - f/u 6pm CBC  - Dispo planning likely rehab     Ortho Pager [phone]

## 2021-10-30 NOTE — PACU DISCHARGE NOTE - COMMENTS
Patient is hemodynamically stable and exhibits no signs of discomfort.  Meets PACU discharge criteria.  Report given to unit RN.  Patient transported via bed-monitored.  Accompanied by RNx2

## 2021-10-30 NOTE — PHYSICAL THERAPY INITIAL EVALUATION ADULT - ADDITIONAL COMMENTS
Pt's daughter at bedside to translate, daughter states he lives with her, he ambulates with SC, she has a WC for long distances, she works from home now but will have to go back to office soon, she hired a [employer]HA 4hrs per day for 5 days. Pt would like to take pt home if possible

## 2021-10-30 NOTE — PROGRESS NOTE ADULT - SUBJECTIVE AND OBJECTIVE BOX
INTERVAL HPI/OVERNIGHT EVENTS:    SUBJECTIVE: Patient seen and examined at bedside.   Daughters and grandson (ED Physician) at bedside  Pt appears more energetic today. States pain in hip is controlled. Eating and drinking wo nausea, abd pain. No numbness. Denies fever, chest pain, dyspnea. +Flatus wo BM. Voiding wo dysuria. Has intermittent dry cough  Has had little uop since TOV. Bladder scan showed 400cc    OBJECTIVE:    VITAL SIGNS:  ICU Vital Signs Last 24 Hrs  T(C): 37 (30 Oct 2021 17:09), Max: 37.2 (29 Oct 2021 23:00)  T(F): 98.6 (30 Oct 2021 17:09), Max: 98.9 (29 Oct 2021 23:00)  HR: 74 (30 Oct 2021 17:09) (74 - 86)  BP: 148/60 (30 Oct 2021 17:09) (134/60 - 183/75)  BP(mean): 89 (29 Oct 2021 23:59) (87 - 98)  ABP: --  ABP(mean): --  RR: 14 (30 Oct 2021 17:09) (11 - 21)  SpO2: 98% (30 Oct 2021 17:09) (96% - 100%)        10-29 @ 07:01  -  10-30 @ 07:00  --------------------------------------------------------  IN: 580 mL / OUT: 600 mL / NET: -20 mL      CAPILLARY BLOOD GLUCOSE          PHYSICAL EXAM:  GEN: Male in NAD on RA  HEENT: NC/AT, MMM  CV: RRR, no murmurs  PULM: Nml effort, CTAB wo rales, rhonchi, or wheezing  Abd: soft, NABS, non-tender wo rebound or guarding  NEURO: Alert, a/o x3 moving all extremities  3+/5 in L hip 2/2 pain. 4/5 in L knee. 5/5 in plantarflex/ext  Sensation equal in BLE  L thigh dressing c/d/i  PSYCH: Appropriate      MEDICATIONS:  MEDICATIONS  (STANDING):  acetaminophen     Tablet .. 975 milliGRAM(s) Oral every 8 hours  allopurinol 100 milliGRAM(s) Oral daily  amLODIPine   Tablet 2.5 milliGRAM(s) Oral daily  aspirin 325 milliGRAM(s) Oral two times a day  atorvastatin 20 milliGRAM(s) Oral at bedtime  finasteride 5 milliGRAM(s) Oral daily  HYDROmorphone  Injectable 0.5 milliGRAM(s) IV Push every 6 hours  lactated ringers. 1000 milliLiter(s) (100 mL/Hr) IV Continuous <Continuous>  losartan 100 milliGRAM(s) Oral daily  pantoprazole    Tablet 40 milliGRAM(s) Oral before breakfast  senna 2 Tablet(s) Oral at bedtime  sodium chloride 0.9%. 1000 milliLiter(s) (120 mL/Hr) IV Continuous <Continuous>  tamsulosin 0.4 milliGRAM(s) Oral at bedtime    MEDICATIONS  (PRN):  magnesium hydroxide Suspension 30 milliLiter(s) Oral daily PRN Constipation  melatonin 3 milliGRAM(s) Oral at bedtime PRN Insomnia  oxyCODONE    IR 5 milliGRAM(s) Oral every 4 hours PRN Moderate Pain (4 - 6)      ALLERGIES:  Allergies    No Known Allergies    Intolerances        LABS:                        8.3    10.96 )-----------( 96       ( 30 Oct 2021 18:22 )             25.1     10-30    134<L>  |  104  |  30<H>  ----------------------------<  154<H>  4.8   |  23  |  1.81<H>    Ca    8.2<L>      30 Oct 2021 08:31        Urinalysis Basic - ( 29 Oct 2021 01:33 )    Color: Yellow / Appearance: Clear / S.020 / pH: x  Gluc: x / Ketone: NEGATIVE  / Bili: Negative / Urobili: 0.2 E.U./dL   Blood: x / Protein: Trace mg/dL / Nitrite: NEGATIVE   Leuk Esterase: NEGATIVE / RBC: < 5 /HPF / WBC < 5 /HPF   Sq Epi: x / Non Sq Epi: 5-10 /HPF / Bacteria: Present /HPF        RADIOLOGY & ADDITIONAL TESTS: Reviewed.

## 2021-10-30 NOTE — PROGRESS NOTE ADULT - SUBJECTIVE AND OBJECTIVE BOX
Ortho Post Op Check    Procedure: L hip martina  Surgeon: Dr. Mayorga    Subjective:  Pain controlled with medication.  Denies CP, SOB, N/V, numbness/tingling.    Objective:  Vital Signs Last 24 Hrs  T(C): 37.2 (10-29-21 @ 23:00), Max: 37.2 (10-29-21 @ 23:00)  T(F): 98.9 (10-29-21 @ 23:00), Max: 98.9 (10-29-21 @ 23:00)  HR: 84 (10-30-21 @ 00:50) (81 - 86)  BP: 150/65 (10-30-21 @ 00:50) (136/62 - 161/68)  BP(mean): 89 (10-29-21 @ 23:59) (87 - 98)  RR: 16 (10-30-21 @ 00:50) (11 - 21)  SpO2: 100% (10-30-21 @ 00:50) (100% - 100%)  AVSS    General: Pt Alert and oriented, NAD  LLE:  Dressing C/D/I  Motor: EHL/FHL/TA/GS firing  Sensation: SILT throughout all nerve distributions  Pulses: Toes WWP    A/P: 89yMale s/p L hip martina on 10-30.  - Stable  - Pain Control  - DVT ppx: SCDs, ASA 325mg bid  - Post op abx: Ancef 2g q8h x2 postoperative doses  - Resume home meds  - PT, WBS: WBAT    Ortho Pager [phone] Ortho Post Op Check    Procedure: L hip martina  Surgeon: Dr. Mayorga    Subjective:  Pain controlled with medication.  Denies CP, SOB, N/V, numbness/tingling.    Objective:  Vital Signs Last 24 Hrs  T(C): 37.2 (10-29-21 @ 23:00), Max: 37.2 (10-29-21 @ 23:00)  T(F): 98.9 (10-29-21 @ 23:00), Max: 98.9 (10-29-21 @ 23:00)  HR: 84 (10-30-21 @ 00:50) (81 - 86)  BP: 150/65 (10-30-21 @ 00:50) (136/62 - 161/68)  BP(mean): 89 (10-29-21 @ 23:59) (87 - 98)  RR: 16 (10-30-21 @ 00:50) (11 - 21)  SpO2: 100% (10-30-21 @ 00:50) (100% - 100%)  AVSS    General: Pt Alert and oriented, NAD  LLE:  Dressing C/D/I  Motor: EHL/FHL/TA/GS firing  Sensation: SILT throughout all nerve distributions  Pulses: Toes WWP    A/P: 89yMale s/p L hip martina on 10-30.  - Stable  - Pain Control  - DVT ppx: SCDs  - Post op abx: Ancef 2g q8h x2 postoperative doses  - Resume home meds  - PT, WBS: WBAT    Ortho Pager [phone]

## 2021-10-31 LAB
ANION GAP SERPL CALC-SCNC: 7 MMOL/L — SIGNIFICANT CHANGE UP (ref 5–17)
BUN SERPL-MCNC: 33 MG/DL — HIGH (ref 7–23)
CALCIUM SERPL-MCNC: 8 MG/DL — LOW (ref 8.4–10.5)
CHLORIDE SERPL-SCNC: 106 MMOL/L — SIGNIFICANT CHANGE UP (ref 96–108)
CO2 SERPL-SCNC: 24 MMOL/L — SIGNIFICANT CHANGE UP (ref 22–31)
CREAT SERPL-MCNC: 1.94 MG/DL — HIGH (ref 0.5–1.3)
GLUCOSE SERPL-MCNC: 130 MG/DL — HIGH (ref 70–99)
HCT VFR BLD CALC: 23.3 % — LOW (ref 39–50)
HCT VFR BLD CALC: 26.6 % — LOW (ref 39–50)
HGB BLD-MCNC: 7.4 G/DL — LOW (ref 13–17)
HGB BLD-MCNC: 8.7 G/DL — LOW (ref 13–17)
MCHC RBC-ENTMCNC: 31.8 GM/DL — LOW (ref 32–36)
MCHC RBC-ENTMCNC: 32.2 PG — SIGNIFICANT CHANGE UP (ref 27–34)
MCHC RBC-ENTMCNC: 32.3 PG — SIGNIFICANT CHANGE UP (ref 27–34)
MCHC RBC-ENTMCNC: 32.7 GM/DL — SIGNIFICANT CHANGE UP (ref 32–36)
MCV RBC AUTO: 101.3 FL — HIGH (ref 80–100)
MCV RBC AUTO: 98.9 FL — SIGNIFICANT CHANGE UP (ref 80–100)
NRBC # BLD: 0 /100 WBCS — SIGNIFICANT CHANGE UP (ref 0–0)
NRBC # BLD: 0 /100 WBCS — SIGNIFICANT CHANGE UP (ref 0–0)
PLATELET # BLD AUTO: 81 K/UL — LOW (ref 150–400)
PLATELET # BLD AUTO: 87 K/UL — LOW (ref 150–400)
POTASSIUM SERPL-MCNC: 4.3 MMOL/L — SIGNIFICANT CHANGE UP (ref 3.5–5.3)
POTASSIUM SERPL-SCNC: 4.3 MMOL/L — SIGNIFICANT CHANGE UP (ref 3.5–5.3)
RBC # BLD: 2.3 M/UL — LOW (ref 4.2–5.8)
RBC # BLD: 2.69 M/UL — LOW (ref 4.2–5.8)
RBC # FLD: 15.1 % — HIGH (ref 10.3–14.5)
RBC # FLD: 15.9 % — HIGH (ref 10.3–14.5)
SODIUM SERPL-SCNC: 137 MMOL/L — SIGNIFICANT CHANGE UP (ref 135–145)
WBC # BLD: 7.82 K/UL — SIGNIFICANT CHANGE UP (ref 3.8–10.5)
WBC # BLD: 9.58 K/UL — SIGNIFICANT CHANGE UP (ref 3.8–10.5)
WBC # FLD AUTO: 7.82 K/UL — SIGNIFICANT CHANGE UP (ref 3.8–10.5)
WBC # FLD AUTO: 9.58 K/UL — SIGNIFICANT CHANGE UP (ref 3.8–10.5)

## 2021-10-31 PROCEDURE — 99233 SBSQ HOSP IP/OBS HIGH 50: CPT

## 2021-10-31 RX ORDER — SODIUM CHLORIDE 9 MG/ML
1000 INJECTION INTRAMUSCULAR; INTRAVENOUS; SUBCUTANEOUS
Refills: 0 | Status: DISCONTINUED | OUTPATIENT
Start: 2021-10-31 | End: 2021-10-31

## 2021-10-31 RX ADMIN — Medication 975 MILLIGRAM(S): at 06:40

## 2021-10-31 RX ADMIN — Medication 325 MILLIGRAM(S): at 17:34

## 2021-10-31 RX ADMIN — AMLODIPINE BESYLATE 2.5 MILLIGRAM(S): 2.5 TABLET ORAL at 06:40

## 2021-10-31 RX ADMIN — ATORVASTATIN CALCIUM 20 MILLIGRAM(S): 80 TABLET, FILM COATED ORAL at 21:20

## 2021-10-31 RX ADMIN — Medication 975 MILLIGRAM(S): at 21:20

## 2021-10-31 RX ADMIN — Medication 975 MILLIGRAM(S): at 14:34

## 2021-10-31 RX ADMIN — FINASTERIDE 5 MILLIGRAM(S): 5 TABLET, FILM COATED ORAL at 06:40

## 2021-10-31 RX ADMIN — Medication 975 MILLIGRAM(S): at 08:26

## 2021-10-31 RX ADMIN — SENNA PLUS 2 TABLET(S): 8.6 TABLET ORAL at 21:20

## 2021-10-31 RX ADMIN — LOSARTAN POTASSIUM 100 MILLIGRAM(S): 100 TABLET, FILM COATED ORAL at 06:40

## 2021-10-31 RX ADMIN — TAMSULOSIN HYDROCHLORIDE 0.4 MILLIGRAM(S): 0.4 CAPSULE ORAL at 21:20

## 2021-10-31 RX ADMIN — Medication 100 MILLIGRAM(S): at 13:19

## 2021-10-31 RX ADMIN — Medication 975 MILLIGRAM(S): at 13:19

## 2021-10-31 RX ADMIN — Medication 975 MILLIGRAM(S): at 21:40

## 2021-10-31 RX ADMIN — PANTOPRAZOLE SODIUM 40 MILLIGRAM(S): 20 TABLET, DELAYED RELEASE ORAL at 06:40

## 2021-10-31 RX ADMIN — Medication 325 MILLIGRAM(S): at 06:40

## 2021-10-31 NOTE — PROGRESS NOTE ADULT - ASSESSMENT
89M w h/o CKD (1.6), BPH, HTN, Gout, cholelithiasis w procedures at [facility] previously, p/w mechanical fall onto L side found to have L FNF - s/p L Hemiarthroplasty 10/29 w Dr. Mayorga, s/p 1u RBC on 10/30 and 10/31    #Post-op state. Pain controlled. On  BID for ppx. On bowel regimen and incentive spirometer  #HTN - Above target. Possibly pain w PT. Continue home BP regimen. Amlodipine 2.5, losartan 100  #Blood loss anemia - 7.4 from 9.7 on baseline. See below  #Thrombocytopenia - 91 from 96 from 102 from 135. Possible dilution effect. Continue to monitor  #hyponatremia - resolved 137 from 134 - stable.  #CKDIII - Cr stable 1.9  from 1.89 (1.6 baseline)  #HLD - c/w home statin  #BPH - on finasteride and flomax at home    Recommendations  Noted drop in hgb to 7.4 from 8.3. s/p 1u on 10/30 which responded appropriately. Possible hemodilution in play as pt was kept on maintenance fluids.   -Agree w 1u RBC  -Pt is very stoic - noted elevated BP in 180s post-PT. Recommend pre-medicating w Tramadol. Otherwise if resting BPs remain high can consider increasing amlodipine to 5mg daily    -Caution w NSAIDs post op due to CKD  -Once farmer removed would perform q6h bladder scans in setting of BPH    DISPO: [facility] vs HPT pending progress. Anticipate DC Mon-Tue

## 2021-10-31 NOTE — PROGRESS NOTE ADULT - SUBJECTIVE AND OBJECTIVE BOX
INTERVAL HPI/OVERNIGHT EVENTS:ANA O/N    SUBJECTIVE: Patient seen and examined at bedside.   Pt's daughter served as   Pt a bit more sleepy this morning. Denies any pain in L hip or leg. No numbness. Denies LH/dizziness, chest pain, palpitations, dyspnea. Eating wo nausea, abd pain. +Flatus wo BM. Voiding wo dysuria. No fever.     1u ordered this AM      OBJECTIVE:    VITAL SIGNS:  ICU Vital Signs Last 24 Hrs  T(C): 36.8 (31 Oct 2021 17:25), Max: 36.9 (30 Oct 2021 21:00)  T(F): 98.2 (31 Oct 2021 17:25), Max: 98.4 (30 Oct 2021 21:00)  HR: 71 (31 Oct 2021 17:25) (71 - 86)  BP: 171/61 (31 Oct 2021 17:25) (145/57 - 189/89)  BP(mean): --  ABP: --  ABP(mean): --  RR: 17 (31 Oct 2021 17:25) (15 - 17)  SpO2: 98% (31 Oct 2021 17:25) (93% - 99%)        10-30 @ 07:01  -  10-31 @ 07:00  --------------------------------------------------------  IN: 2700 mL / OUT: 1000 mL / NET: 1700 mL    10-31 @ 07:01  -  10-31 @ 18:34  --------------------------------------------------------  IN: 1520 mL / OUT: 500 mL / NET: 1020 mL      CAPILLARY BLOOD GLUCOSE          PHYSICAL EXAM:  GEN: Male in NAD on RA  HEENT: NC/AT, MMM  CV: RRR, nml S1S2, no murmurs  PULM: nml effort, CTAB wo rales, rhonchi, or wheezing  ABD: Soft, non-distended, NABS, non-tender  NEURO  A/O x3, moving all extremities, Sensation intact  4/5 in L hip and knee limited 2/2 pain. L thigh dressing c/d/i. Does not appear to have hematoma.  PSYCH: Appropriate      MEDICATIONS:  MEDICATIONS  (STANDING):  acetaminophen     Tablet .. 975 milliGRAM(s) Oral every 8 hours  allopurinol 100 milliGRAM(s) Oral daily  amLODIPine   Tablet 2.5 milliGRAM(s) Oral daily  aspirin 325 milliGRAM(s) Oral two times a day  atorvastatin 20 milliGRAM(s) Oral at bedtime  finasteride 5 milliGRAM(s) Oral daily  HYDROmorphone  Injectable 0.5 milliGRAM(s) IV Push every 6 hours  losartan 100 milliGRAM(s) Oral daily  pantoprazole    Tablet 40 milliGRAM(s) Oral before breakfast  senna 2 Tablet(s) Oral at bedtime  tamsulosin 0.4 milliGRAM(s) Oral at bedtime    MEDICATIONS  (PRN):  magnesium hydroxide Suspension 30 milliLiter(s) Oral daily PRN Constipation  melatonin 3 milliGRAM(s) Oral at bedtime PRN Insomnia  oxyCODONE    IR 5 milliGRAM(s) Oral every 4 hours PRN Moderate Pain (4 - 6)      ALLERGIES:  Allergies    No Known Allergies    Intolerances        LABS:                        7.4    7.82  )-----------( 81       ( 31 Oct 2021 07:42 )             23.3     10-31    137  |  106  |  33<H>  ----------------------------<  130<H>  4.3   |  24  |  1.94<H>    Ca    8.0<L>      31 Oct 2021 07:42            RADIOLOGY & ADDITIONAL TESTS: Reviewed.

## 2021-10-31 NOTE — PROGRESS NOTE ADULT - SUBJECTIVE AND OBJECTIVE BOX
Ortho Note    Pt comfortable without complaints, got 1 unit pRBC yesterday and responded appropriately. However, drop in H/H again today. Pain controlled. Denies CP, SOB, N/V, numbness/tingling     Vital Signs Last 24 Hrs  T(C): 36.8 (10-31-21 @ 06:42), Max: 36.8 (10-31-21 @ 06:42)  T(F): 98.2 (10-31-21 @ 06:42), Max: 98.2 (10-31-21 @ 06:42)  HR: 77 (10-31-21 @ 06:42) (77 - 77)  BP: 163/59 (10-31-21 @ 06:42) (163/59 - 163/59)  BP(mean): --  RR: 16 (10-31-21 @ 06:42) (16 - 16)  SpO2: 98% (10-31-21 @ 06:42) (98% - 98%)  AVSS    General: Pt Alert and oriented, NAD  L hip aquacell DSG C/D/I  Sensation intact s/s/sp/dp/t and symmetric   Motor: EHL/FHL/TA/GS firing   2+ DP pulse  Toes WWP        A/P:  89yMale s/p L hip martina 10/29  - Stable  - Pain Control  - DVT ppx: ASA  - PT, WBS: WBAT  - Low Hgb, transfused 1 unit pRBC  - f/u 6pm CBC  - Dispo planning likely rehab     Ortho Pager [phone]

## 2021-11-01 LAB
ANION GAP SERPL CALC-SCNC: 6 MMOL/L — SIGNIFICANT CHANGE UP (ref 5–17)
BUN SERPL-MCNC: 28 MG/DL — HIGH (ref 7–23)
CALCIUM SERPL-MCNC: 7.9 MG/DL — LOW (ref 8.4–10.5)
CHLORIDE SERPL-SCNC: 106 MMOL/L — SIGNIFICANT CHANGE UP (ref 96–108)
CO2 SERPL-SCNC: 23 MMOL/L — SIGNIFICANT CHANGE UP (ref 22–31)
CREAT SERPL-MCNC: 1.78 MG/DL — HIGH (ref 0.5–1.3)
GLUCOSE SERPL-MCNC: 115 MG/DL — HIGH (ref 70–99)
HCT VFR BLD CALC: 26.7 % — LOW (ref 39–50)
HGB BLD-MCNC: 8.5 G/DL — LOW (ref 13–17)
MCHC RBC-ENTMCNC: 31.5 PG — SIGNIFICANT CHANGE UP (ref 27–34)
MCHC RBC-ENTMCNC: 31.8 GM/DL — LOW (ref 32–36)
MCV RBC AUTO: 98.9 FL — SIGNIFICANT CHANGE UP (ref 80–100)
NRBC # BLD: 0 /100 WBCS — SIGNIFICANT CHANGE UP (ref 0–0)
PLATELET # BLD AUTO: 87 K/UL — LOW (ref 150–400)
POTASSIUM SERPL-MCNC: 4.3 MMOL/L — SIGNIFICANT CHANGE UP (ref 3.5–5.3)
POTASSIUM SERPL-SCNC: 4.3 MMOL/L — SIGNIFICANT CHANGE UP (ref 3.5–5.3)
RBC # BLD: 2.7 M/UL — LOW (ref 4.2–5.8)
RBC # FLD: 15.4 % — HIGH (ref 10.3–14.5)
SODIUM SERPL-SCNC: 135 MMOL/L — SIGNIFICANT CHANGE UP (ref 135–145)
WBC # BLD: 6.84 K/UL — SIGNIFICANT CHANGE UP (ref 3.8–10.5)
WBC # FLD AUTO: 6.84 K/UL — SIGNIFICANT CHANGE UP (ref 3.8–10.5)

## 2021-11-01 PROCEDURE — 99233 SBSQ HOSP IP/OBS HIGH 50: CPT

## 2021-11-01 RX ORDER — AMLODIPINE BESYLATE 2.5 MG/1
5 TABLET ORAL DAILY
Refills: 0 | Status: DISCONTINUED | OUTPATIENT
Start: 2021-11-02 | End: 2021-11-05

## 2021-11-01 RX ORDER — AMLODIPINE BESYLATE 2.5 MG/1
2.5 TABLET ORAL ONCE
Refills: 0 | Status: COMPLETED | OUTPATIENT
Start: 2021-11-01 | End: 2021-11-01

## 2021-11-01 RX ADMIN — AMLODIPINE BESYLATE 2.5 MILLIGRAM(S): 2.5 TABLET ORAL at 05:58

## 2021-11-01 RX ADMIN — Medication 975 MILLIGRAM(S): at 22:33

## 2021-11-01 RX ADMIN — SENNA PLUS 2 TABLET(S): 8.6 TABLET ORAL at 22:33

## 2021-11-01 RX ADMIN — Medication 100 MILLIGRAM(S): at 13:11

## 2021-11-01 RX ADMIN — Medication 975 MILLIGRAM(S): at 05:58

## 2021-11-01 RX ADMIN — Medication 325 MILLIGRAM(S): at 17:17

## 2021-11-01 RX ADMIN — ATORVASTATIN CALCIUM 20 MILLIGRAM(S): 80 TABLET, FILM COATED ORAL at 22:33

## 2021-11-01 RX ADMIN — PANTOPRAZOLE SODIUM 40 MILLIGRAM(S): 20 TABLET, DELAYED RELEASE ORAL at 07:18

## 2021-11-01 RX ADMIN — AMLODIPINE BESYLATE 2.5 MILLIGRAM(S): 2.5 TABLET ORAL at 11:41

## 2021-11-01 RX ADMIN — TAMSULOSIN HYDROCHLORIDE 0.4 MILLIGRAM(S): 0.4 CAPSULE ORAL at 22:33

## 2021-11-01 RX ADMIN — Medication 975 MILLIGRAM(S): at 13:12

## 2021-11-01 RX ADMIN — Medication 975 MILLIGRAM(S): at 22:50

## 2021-11-01 RX ADMIN — FINASTERIDE 5 MILLIGRAM(S): 5 TABLET, FILM COATED ORAL at 13:11

## 2021-11-01 RX ADMIN — Medication 975 MILLIGRAM(S): at 06:20

## 2021-11-01 RX ADMIN — MAGNESIUM HYDROXIDE 30 MILLILITER(S): 400 TABLET, CHEWABLE ORAL at 19:24

## 2021-11-01 RX ADMIN — LOSARTAN POTASSIUM 100 MILLIGRAM(S): 100 TABLET, FILM COATED ORAL at 05:58

## 2021-11-01 RX ADMIN — Medication 975 MILLIGRAM(S): at 14:00

## 2021-11-01 RX ADMIN — Medication 325 MILLIGRAM(S): at 05:58

## 2021-11-01 NOTE — PROGRESS NOTE ADULT - SUBJECTIVE AND OBJECTIVE BOX
INTERVAL HPI/OVERNIGHT EVENTS: ANA O/N    SUBJECTIVE: Patient seen and examined at bedside.   Daughter serving as Cantonese   Pt denies pain in his L hip. No numbness. He denies any fever, chest pain, cough, dyspnea. No nausea, abd pain. +flatus but no BM. Has been voiding wo pain.     OBJECTIVE:    VITAL SIGNS:  ICU Vital Signs Last 24 Hrs  T(C): 36.8 (01 Nov 2021 14:13), Max: 36.8 (31 Oct 2021 17:25)  T(F): 98.3 (01 Nov 2021 14:13), Max: 98.3 (01 Nov 2021 14:13)  HR: 79 (01 Nov 2021 14:13) (64 - 87)  BP: 163/70 (01 Nov 2021 14:13) (160/58 - 189/89)  BP(mean): --  ABP: --  ABP(mean): --  RR: 17 (01 Nov 2021 14:13) (17 - 18)  SpO2: 97% (01 Nov 2021 14:13) (93% - 98%)        10-31 @ 07:01 - 11-01 @ 07:00  --------------------------------------------------------  IN: 1950 mL / OUT: 1380 mL / NET: 570 mL    11-01 @ 07:01 - 11-01 @ 14:31  --------------------------------------------------------  IN: 0 mL / OUT: 450 mL / NET: -450 mL      CAPILLARY BLOOD GLUCOSE          PHYSICAL EXAM:  GEN: Male in NAD on RA  HEENT: NC/AT, MMM  CV: RRR, nml S1S2, no murmurs  PULM: nml effort, CTAB wo rales, rhonchi, or wheezing  ABD: Soft, non-distended, NABS, non-tender  NEURO  A/O x3, moving all extremities, Sensation intact  4/5 in L hip and knee limited 2/2 pain. L thigh dressing c/d/i. Does not appear to have hematoma.  PSYCH: Appropriate    MEDICATIONS:  MEDICATIONS  (STANDING):  acetaminophen     Tablet .. 975 milliGRAM(s) Oral every 8 hours  allopurinol 100 milliGRAM(s) Oral daily  aspirin 325 milliGRAM(s) Oral two times a day  atorvastatin 20 milliGRAM(s) Oral at bedtime  finasteride 5 milliGRAM(s) Oral daily  HYDROmorphone  Injectable 0.5 milliGRAM(s) IV Push every 6 hours  losartan 100 milliGRAM(s) Oral daily  pantoprazole    Tablet 40 milliGRAM(s) Oral before breakfast  senna 2 Tablet(s) Oral at bedtime  tamsulosin 0.4 milliGRAM(s) Oral at bedtime    MEDICATIONS  (PRN):  magnesium hydroxide Suspension 30 milliLiter(s) Oral daily PRN Constipation  melatonin 3 milliGRAM(s) Oral at bedtime PRN Insomnia  oxyCODONE    IR 5 milliGRAM(s) Oral every 4 hours PRN Moderate Pain (4 - 6)      ALLERGIES:  Allergies    No Known Allergies    Intolerances        LABS:                        8.5    6.84  )-----------( 87       ( 01 Nov 2021 07:54 )             26.7     11-01    135  |  106  |  28<H>  ----------------------------<  115<H>  4.3   |  23  |  1.78<H>    Ca    7.9<L>      01 Nov 2021 07:54            RADIOLOGY & ADDITIONAL TESTS: Reviewed.

## 2021-11-01 NOTE — PROGRESS NOTE ADULT - ASSESSMENT
89M w h/o CKD (1.6), BPH, HTN, Gout, cholelithiasis w procedures at [facility] previously, p/w mechanical fall onto L side found to have L FNF - s/p L Hemiarthroplasty 10/29 w Dr. Mayorga, s/p 1u RBC on 10/30 and 10/31    #Post-op state. Pain controlled. On  BID for ppx. On bowel regimen and incentive spirometer  #HTN - Above target. Possibly pain w PT. Home regimen as follows: Amlodipine 2.5, losartan 100  #Blood loss anemia -responded appropriately 8.5 from 7.4 s/p 1u 10/31. Also 1u 10/30. Pt was 9.7 on baseline  #Thrombocytopenia - stable at 87 x2. 135 at admission. Possible dilution effect. Continue to monitor  #hyponatremia - resolved.  #CKDIII - Cr improved to 1.78 from 1.9 (1.6 baseline)  #HLD - c/w home statin  #BPH - on finasteride and flomax at home    Recommendations  Increase amlodipine to 5mg today. Suspect pain during PT. Recommend pre-medication prior to PT sessions  CBC w diff in AM  Encourage OOB to chair if able. Encourage incentive spirometer use  Titrate nasal canula to sat > 90%    -Caution w NSAIDs post op due to CKD    DISPO: [facility] vs HPT pending progress. Anticipate DC Mon-Tue

## 2021-11-01 NOTE — PROGRESS NOTE ADULT - ASSESSMENT
A/P: 89M w/HTN, CKD, BPH, gout, possible TIA per the family adm to ortho on 10/29 for mechanical fall w/L hip fracture. Cardiology consulted for preop assessment.    - recommend patient f/u with outpt cardiology  (his PMD happens to be a cardiologist) for monitoring of his mild-mod AS  - no further workup needed at this time.    Cardiology to sign off. Please reconsult with further questions.    Recommendations are final when signed by attending.    --  Alex Lopez MD  Cardiology PGY6

## 2021-11-01 NOTE — PROGRESS NOTE ADULT - SUBJECTIVE AND OBJECTIVE BOX
Ortho Note    Pt comfortable without complaints, pain controlled.   Denies CP, SOB, N/V, numbness/tingling down le b/l.    Vital Signs Last 24 Hrs  T(C): 36.7 (11-01-21 @ 09:29), Max: 36.7 (11-01-21 @ 09:29)  T(F): 98 (11-01-21 @ 09:29), Max: 98 (11-01-21 @ 09:29)  HR: 67 (11-01-21 @ 09:29) (67 - 67)  BP: 169/67 (11-01-21 @ 09:29) (169/67 - 169/67)  BP(mean): --  RR: 17 (11-01-21 @ 09:29) (17 - 17)  SpO2: 97% (11-01-21 @ 09:29) (97% - 97%)      General: Pt Alert and oriented, NAD  DSG prevena L hip C/D/I holding suction  Pulses:  DPs palpable b/l   Sensation: SILT throughout le b/l   Motor: quad/hamstring/EHL/FHL/TA/GS 5/5 b/l le           A/P: 89yMale POD#3 s/p L hip hemiarthroplasty   - Stable  - Pain Control prn   - DVT ppx: asa  - PT, WBS:  WBAT le b/l   - trend labs   - Medicine and cardiology following will continue to appreciate recs  - dispo split plan Home vs [facility] pending PT progress      #HTN  - per medicine increased amlodipine to 5mg qd    Ortho Pager [phone]

## 2021-11-01 NOTE — PROGRESS NOTE ADULT - SUBJECTIVE AND OBJECTIVE BOX
INTERVAL HPI/OVERNIGHT EVENTS: Patient seen and examined at bedside.     VITAL SIGNS:  T(F): 97.7 (11-01-21 @ 05:55)  HR: 64 (11-01-21 @ 05:55)  BP: 160/58 (11-01-21 @ 05:55)  RR: 18 (11-01-21 @ 05:55)  SpO2: 98% (11-01-21 @ 05:55)  Wt(kg): --    10-31-21 @ 07:01  -  11-01-21 @ 07:00  --------------------------------------------------------  IN: 1950 mL / OUT: 1380 mL / NET: 570 mL    11-01-21 @ 07:01  -  11-01-21 @ 09:37  --------------------------------------------------------  IN: 0 mL / OUT: 200 mL / NET: -200 mL        PHYSICAL EXAM:    GEN: Awake, comfortable. NAD.   HEENT: NCAT, PERRL, EOMI. Mucosa moist.   NECK: Supple, no JVD.   RESP: CTA b/l  CV: RRR, normal s1/s2. No m/r/g.  ABD: Soft, NTND. BS+  EXT: Warm. No edema, clubbing, or cyanosis.   NEURO: AAOx3. No focal deficits.    MEDICATIONS  (STANDING):  acetaminophen     Tablet .. 975 milliGRAM(s) Oral every 8 hours  allopurinol 100 milliGRAM(s) Oral daily  amLODIPine   Tablet 2.5 milliGRAM(s) Oral daily  aspirin 325 milliGRAM(s) Oral two times a day  atorvastatin 20 milliGRAM(s) Oral at bedtime  finasteride 5 milliGRAM(s) Oral daily  HYDROmorphone  Injectable 0.5 milliGRAM(s) IV Push every 6 hours  losartan 100 milliGRAM(s) Oral daily  pantoprazole    Tablet 40 milliGRAM(s) Oral before breakfast  senna 2 Tablet(s) Oral at bedtime  tamsulosin 0.4 milliGRAM(s) Oral at bedtime    MEDICATIONS  (PRN):  magnesium hydroxide Suspension 30 milliLiter(s) Oral daily PRN Constipation  melatonin 3 milliGRAM(s) Oral at bedtime PRN Insomnia  oxyCODONE    IR 5 milliGRAM(s) Oral every 4 hours PRN Moderate Pain (4 - 6)      Allergies    No Known Allergies    Intolerances        LABS:                        8.5    6.84  )-----------( 87       ( 01 Nov 2021 07:54 )             26.7     11-01    135  |  106  |  28<H>  ----------------------------<  115<H>  4.3   |  23  |  1.78<H>    Ca    7.9<L>      01 Nov 2021 07:54                EKG:    Echo:    Cath:    NST:    RADIOLOGY & ADDITIONAL TESTS:   INTERVAL HPI/OVERNIGHT EVENTS: Patient seen and examined at bedside with daughter at bedside. Denies chest pain    VITAL SIGNS:  T(F): 97.7 (11-01-21 @ 05:55)  HR: 64 (11-01-21 @ 05:55)  BP: 160/58 (11-01-21 @ 05:55)  RR: 18 (11-01-21 @ 05:55)  SpO2: 98% (11-01-21 @ 05:55)  Wt(kg): --    10-31-21 @ 07:01  -  11-01-21 @ 07:00  --------------------------------------------------------  IN: 1950 mL / OUT: 1380 mL / NET: 570 mL    11-01-21 @ 07:01  -  11-01-21 @ 09:37  --------------------------------------------------------  IN: 0 mL / OUT: 200 mL / NET: -200 mL        PHYSICAL EXAM:    GEN: Awake, comfortable. NAD.   HEENT: NCAT, PERRL, EOMI. Mucosa moist.   NECK: Supple, no JVD.   RESP: CTA b/l  CV: RRR, normal s1/s2. No m/r/g.  ABD: Soft, NTND. BS+  EXT: Warm. No edema, clubbing, or cyanosis.   NEURO: AAOx3. No focal deficits.    MEDICATIONS  (STANDING):  acetaminophen     Tablet .. 975 milliGRAM(s) Oral every 8 hours  allopurinol 100 milliGRAM(s) Oral daily  amLODIPine   Tablet 2.5 milliGRAM(s) Oral daily  aspirin 325 milliGRAM(s) Oral two times a day  atorvastatin 20 milliGRAM(s) Oral at bedtime  finasteride 5 milliGRAM(s) Oral daily  HYDROmorphone  Injectable 0.5 milliGRAM(s) IV Push every 6 hours  losartan 100 milliGRAM(s) Oral daily  pantoprazole    Tablet 40 milliGRAM(s) Oral before breakfast  senna 2 Tablet(s) Oral at bedtime  tamsulosin 0.4 milliGRAM(s) Oral at bedtime    MEDICATIONS  (PRN):  magnesium hydroxide Suspension 30 milliLiter(s) Oral daily PRN Constipation  melatonin 3 milliGRAM(s) Oral at bedtime PRN Insomnia  oxyCODONE    IR 5 milliGRAM(s) Oral every 4 hours PRN Moderate Pain (4 - 6)      Allergies    No Known Allergies    Intolerances        LABS:                        8.5    6.84  )-----------( 87       ( 01 Nov 2021 07:54 )             26.7     11-01    135  |  106  |  28<H>  ----------------------------<  115<H>  4.3   |  23  |  1.78<H>    Ca    7.9<L>      01 Nov 2021 07:54                EKG:    Echo: < from: TTE Echo Complete w/o Contrast w/ Doppler (10.29.21 @ 14:38) >  --------------------------------------------------------------------------------  CONCLUSIONS:     1. Mild symmetric left ventricular hypertrophy.   2. Normal left ventricular size and systolic function.   3. Grade II left ventricular diastolic dysfunction.   4. Normal right ventricular size and systolic function.   5. Mildly dilated left atrium.   6.Mild aortic stenosis. Peak transvalvular velocity is 2.90 m/s, the mean transvalvular gradient is 20.00 mmHg, and the LVOT/AV velocity ratio is 0.55. Peak transaortic gradient is 33.64 mmHg. Aortic valve area (estimated via the continuity method) is 1.66 cm².   7. Mild aortic regurgitation.   8. Mild mitral stenosis.   9. Mild tricuspid regurgitation.  10. Pulmonary hypertension present, pulmonary artery systolic pressure is 56 mmHg.  11. No pericardial effusion.  12. Aortic root is mildly dilated measuring 3.80 cm. 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  13. No prior echo is available for comparison.    --------------------------------------------------------------------------------    < end of copied text >      Cath:    NST:    RADIOLOGY & ADDITIONAL TESTS:

## 2021-11-01 NOTE — PROGRESS NOTE ADULT - SUBJECTIVE AND OBJECTIVE BOX
Ortho Note    Pt seen and examined on morning rounds. Pt comfortable without complaints, pain controlled.  Denies CP, SOB, N/V, numbness/tingling     Vital Signs Last 24 Hrs  T(C): 36.5 (11-01-21 @ 05:55), Max: 36.7 (11-01-21 @ 00:45)  T(F): 97.7 (11-01-21 @ 05:55), Max: 98 (11-01-21 @ 00:45)  HR: 64 (11-01-21 @ 05:55) (64 - 69)  BP: 160/58 (11-01-21 @ 05:55) (160/58 - 167/65)  BP(mean): --  RR: 18 (11-01-21 @ 05:55) (18 - 18)  SpO2: 98% (11-01-21 @ 05:55) (98% - 98%)  I&O's Summary    30 Oct 2021 07:01  -  31 Oct 2021 07:00  --------------------------------------------------------  IN: 2700 mL / OUT: 1000 mL / NET: 1700 mL    31 Oct 2021 07:01  -  01 Nov 2021 06:22  --------------------------------------------------------  IN: 1950 mL / OUT: 1380 mL / NET: 570 mL        Physical Exam:  General: Pt Alert and oriented, NAD  DSG aquacell C/D/I  Pulses: 2+ dp, pt pulses, toes wwp, cap refill <3 seconds  Sensation: SILT sural/saph/sp/dp/ tibial distributions  Motor: EHL/FHL/TA/GS 5/5                          8.7    9.58  )-----------( 87       ( 31 Oct 2021 18:30 )             26.6     10-31    137  |  106  |  33<H>  ----------------------------<  130<H>  4.3   |  24  |  1.94<H>    Ca    8.0<L>      31 Oct 2021 07:42        A/P:  89yMale s/p L hip martina 10/29  - Stable  - Pain Control  - DVT ppx: ASA  - PT, WBS: WBAT  - Low Hgb, transfused 1 unit pRBC  - 6pm CBC 10/31 8.7 stable  - Dispo: rehab     Jevon Lemos, PGY-1  Ortho Pager [phone] Ortho Note    Pt seen and examined on morning rounds. Pt comfortable without complaints, pain controlled. Daughter at bedside prefers father to be dispo'd for home with home health aid as patient cannot speak english for rehab facility.  Denies CP, SOB, N/V, numbness/tingling     Vital Signs Last 24 Hrs  T(C): 36.5 (11-01-21 @ 05:55), Max: 36.7 (11-01-21 @ 00:45)  T(F): 97.7 (11-01-21 @ 05:55), Max: 98 (11-01-21 @ 00:45)  HR: 64 (11-01-21 @ 05:55) (64 - 69)  BP: 160/58 (11-01-21 @ 05:55) (160/58 - 167/65)  BP(mean): --  RR: 18 (11-01-21 @ 05:55) (18 - 18)  SpO2: 98% (11-01-21 @ 05:55) (98% - 98%)  I&O's Summary    30 Oct 2021 07:01  -  31 Oct 2021 07:00  --------------------------------------------------------  IN: 2700 mL / OUT: 1000 mL / NET: 1700 mL    31 Oct 2021 07:01  -  01 Nov 2021 06:22  --------------------------------------------------------  IN: 1950 mL / OUT: 1380 mL / NET: 570 mL        Physical Exam:  General: Pt Alert and oriented, NAD  DSG aquacell C/D/I  Pulses: 2+ dp, pt pulses, toes wwp, cap refill <3 seconds  Sensation: SILT sural/saph/sp/dp/ tibial distributions  Motor: EHL/FHL/TA/GS 5/5                          8.7    9.58  )-----------( 87       ( 31 Oct 2021 18:30 )             26.6     10-31    137  |  106  |  33<H>  ----------------------------<  130<H>  4.3   |  24  |  1.94<H>    Ca    8.0<L>      31 Oct 2021 07:42        A/P:  89yMale s/p L hip martina 10/29  - Stable  - Pain Control  - DVT ppx: ASA  - PT, WBS: WBAT  - Low Hgb, transfused 1 unit pRBC  - 6pm CBC 10/31 8.7 stable  - Dispo: rehab     Jevon Lemos, PGY-1  Ortho Pager [phone] Ortho Note    Pt seen and examined on morning rounds. Pt comfortable without complaints, pain controlled. Daughter at bedside prefers father to be dispo'd for home with home health aid as patient cannot speak english for rehab facility.  Denies CP, SOB, N/V, numbness/tingling     Vital Signs Last 24 Hrs  T(C): 36.5 (11-01-21 @ 05:55), Max: 36.7 (11-01-21 @ 00:45)  T(F): 97.7 (11-01-21 @ 05:55), Max: 98 (11-01-21 @ 00:45)  HR: 64 (11-01-21 @ 05:55) (64 - 69)  BP: 160/58 (11-01-21 @ 05:55) (160/58 - 167/65)  BP(mean): --  RR: 18 (11-01-21 @ 05:55) (18 - 18)  SpO2: 98% (11-01-21 @ 05:55) (98% - 98%)  I&O's Summary    30 Oct 2021 07:01  -  31 Oct 2021 07:00  --------------------------------------------------------  IN: 2700 mL / OUT: 1000 mL / NET: 1700 mL    31 Oct 2021 07:01  -  01 Nov 2021 06:22  --------------------------------------------------------  IN: 1950 mL / OUT: 1380 mL / NET: 570 mL        Physical Exam:  General: Pt Alert and oriented, NAD  DSG aquacell C/D/I  Pulses: 2+ dp, pt pulses, toes wwp, cap refill <3 seconds  Sensation: SILT sural/saph/sp/dp/ tibial distributions  Motor: EHL/FHL/TA/GS 5/5                          8.7    9.58  )-----------( 87       ( 31 Oct 2021 18:30 )             26.6     10-31    137  |  106  |  33<H>  ----------------------------<  130<H>  4.3   |  24  |  1.94<H>    Ca    8.0<L>      31 Oct 2021 07:42        A/P:  89yMale s/p L hip martina 10/29  - Stable  - Pain Control  - DVT ppx: ASA  - PT, WBS: WBAT  - Low Hgb, transfused 1 unit pRBC  - 6pm CBC 10/31 8.7 stable  - Dispo: rehab vs home with HPT    Jevon Lemos, PGY-1  Ortho Pager [phone]

## 2021-11-02 ENCOUNTER — TRANSCRIPTION ENCOUNTER (OUTPATIENT)
Age: 86
End: 2021-11-02

## 2021-11-02 LAB
ANION GAP SERPL CALC-SCNC: 7 MMOL/L — SIGNIFICANT CHANGE UP (ref 5–17)
BUN SERPL-MCNC: 25 MG/DL — HIGH (ref 7–23)
CALCIUM SERPL-MCNC: 8 MG/DL — LOW (ref 8.4–10.5)
CHLORIDE SERPL-SCNC: 104 MMOL/L — SIGNIFICANT CHANGE UP (ref 96–108)
CO2 SERPL-SCNC: 23 MMOL/L — SIGNIFICANT CHANGE UP (ref 22–31)
CREAT SERPL-MCNC: 1.57 MG/DL — HIGH (ref 0.5–1.3)
GLUCOSE SERPL-MCNC: 141 MG/DL — HIGH (ref 70–99)
HCT VFR BLD CALC: 25.8 % — LOW (ref 39–50)
HGB BLD-MCNC: 8.3 G/DL — LOW (ref 13–17)
MCHC RBC-ENTMCNC: 32 PG — SIGNIFICANT CHANGE UP (ref 27–34)
MCHC RBC-ENTMCNC: 32.2 GM/DL — SIGNIFICANT CHANGE UP (ref 32–36)
MCV RBC AUTO: 99.6 FL — SIGNIFICANT CHANGE UP (ref 80–100)
NRBC # BLD: 0 /100 WBCS — SIGNIFICANT CHANGE UP (ref 0–0)
PLATELET # BLD AUTO: 105 K/UL — LOW (ref 150–400)
POTASSIUM SERPL-MCNC: 4.2 MMOL/L — SIGNIFICANT CHANGE UP (ref 3.5–5.3)
POTASSIUM SERPL-SCNC: 4.2 MMOL/L — SIGNIFICANT CHANGE UP (ref 3.5–5.3)
RBC # BLD: 2.59 M/UL — LOW (ref 4.2–5.8)
RBC # FLD: 15.2 % — HIGH (ref 10.3–14.5)
SODIUM SERPL-SCNC: 134 MMOL/L — LOW (ref 135–145)
WBC # BLD: 10.43 K/UL — SIGNIFICANT CHANGE UP (ref 3.8–10.5)
WBC # FLD AUTO: 10.43 K/UL — SIGNIFICANT CHANGE UP (ref 3.8–10.5)

## 2021-11-02 RX ADMIN — Medication 975 MILLIGRAM(S): at 21:00

## 2021-11-02 RX ADMIN — Medication 325 MILLIGRAM(S): at 06:57

## 2021-11-02 RX ADMIN — Medication 975 MILLIGRAM(S): at 07:20

## 2021-11-02 RX ADMIN — Medication 975 MILLIGRAM(S): at 13:50

## 2021-11-02 RX ADMIN — PANTOPRAZOLE SODIUM 40 MILLIGRAM(S): 20 TABLET, DELAYED RELEASE ORAL at 06:57

## 2021-11-02 RX ADMIN — LOSARTAN POTASSIUM 100 MILLIGRAM(S): 100 TABLET, FILM COATED ORAL at 06:58

## 2021-11-02 RX ADMIN — Medication 975 MILLIGRAM(S): at 13:11

## 2021-11-02 RX ADMIN — Medication 975 MILLIGRAM(S): at 06:57

## 2021-11-02 RX ADMIN — Medication 100 MILLIGRAM(S): at 13:11

## 2021-11-02 RX ADMIN — Medication 325 MILLIGRAM(S): at 17:52

## 2021-11-02 RX ADMIN — AMLODIPINE BESYLATE 5 MILLIGRAM(S): 2.5 TABLET ORAL at 06:57

## 2021-11-02 RX ADMIN — Medication 975 MILLIGRAM(S): at 22:00

## 2021-11-02 RX ADMIN — TAMSULOSIN HYDROCHLORIDE 0.4 MILLIGRAM(S): 0.4 CAPSULE ORAL at 21:01

## 2021-11-02 RX ADMIN — FINASTERIDE 5 MILLIGRAM(S): 5 TABLET, FILM COATED ORAL at 13:11

## 2021-11-02 NOTE — PROGRESS NOTE ADULT - SUBJECTIVE AND OBJECTIVE BOX
Ortho Note    Pt comfortable without complaints, pain controlled.   Denies CP, SOB, N/V, numbness/tingling down le b/l.    Vital Signs Last 24 Hrs  T(C): 36.8 (02 Nov 2021 06:50), Max: 36.8 (01 Nov 2021 14:13)  T(F): 98.2 (02 Nov 2021 06:50), Max: 98.3 (01 Nov 2021 14:13)  HR: 77 (02 Nov 2021 06:50) (67 - 91)  BP: 159/60 (02 Nov 2021 06:50) (151/66 - 171/62)  BP(mean): --  RR: 18 (02 Nov 2021 06:50) (17 - 18)  SpO2: 95% (02 Nov 2021 06:50) (93% - 97%)    General: Pt Alert and oriented, NAD  DSG prevena L hip C/D/I holding suction  Pulses:  DPs palpable b/l   Sensation: SILT throughout le b/l   Motor: quad/hamstring/EHL/FHL/TA/GS 5/5 b/l le       A/P: 89yMale POD#4 s/p L hip hemiarthroplasty   - Stable  - Pain Control prn   - DVT ppx: asa  - PT, WBS:  WBAT le b/l   - trend labs   - Medicine and cardiology following will continue to appreciate recs  - MARISEL      #HTN  - per medicine increased amlodipine to 5mg qd    Ortho Pager [phone]

## 2021-11-02 NOTE — DIETITIAN INITIAL EVALUATION ADULT. - OTHER CALCULATIONS
ABW used to calculate energy needs due to pt's current body weight within % IBW (120%). Increased needs 2/2 s/p Sx

## 2021-11-02 NOTE — DISCHARGE NOTE PROVIDER - CARE PROVIDER_API CALL
Danial Mayorga)  Orthopaedic Surgery Surgery  [address] [place] [address]  Phone: [phone]  Fax: [phone]  Follow Up Time: 2 weeks   Danial Mayorga)  Orthopaedic Surgery Surgery  [address] [address]  Phone: [phone]  Fax: [phone]  Follow Up Time: 2 weeks    Jeff Johnson)  Vascular Surgery  [address] [place] [address]  Phone: [phone]  Fax: [phone]  Follow Up Time: 1 week

## 2021-11-02 NOTE — DISCHARGE NOTE PROVIDER - NSDCFUADDINST_GEN_ALL_CORE_FT
Weight bear as tolerated with assistive device.  No strenuous activity, heavy lifting, driving or returning to work until cleared by MD.  You may shower - dressing is water-resistant, no soaking in bathtubs.  Remove dressing after post op day 5-7, then leave incision open to air. Keep incision clean and dry.  Try to have regular bowel movements, take stool softener or laxative if necessary.  May take Pepcid or Prilosec for upset stomach.  May take Aleve or Naproxen if needed.  Do not apply any creams or ointments on the incision or around the incision/dressing.  Swelling may travel all the way down leg to foot, this is normal and will subside in a few weeks.  Call to schedule an appt with Dr. Mayorga for follow up, if you have staples or sutures they will be removed in office.  Contact your doctor if you experience: fever greater than 101.5, chills, chest pain, difficulty breathing, redness or excessive drainage around the incision, other concerns.  Follow up with your primary care provider.  Weight bear as tolerated with assistive device.    No strenuous activity, heavy lifting, driving or returning to work until cleared by MD.    You may shower - dressing is water-resistant, no soaking in bathtubs.    Remove dressing after post op day 5-7, then leave incision open to air. Keep incision clean and dry.    Try to have regular bowel movements, take stool softener or laxative if necessary.    May take Pepcid or Prilosec for upset stomach.    May take Aleve or Naproxen if needed.    Do not apply any creams or ointments on the incision or around the incision/dressing.    Swelling may travel all the way down leg to foot, this is normal and will subside in a few weeks.    Call to schedule an appt with Dr. Mayorga for follow up, if you have staples or sutures they will be removed in office.    Contact your doctor if you experience: fever greater than 101.5, chills, chest pain, difficulty breathing, redness or excessive drainage around the incision, other concerns.  Follow up with your primary care provider.     Vascular was consulted for the Redness in your right arm where the IV was previously. Vascular found a cephalic vein thrombus of the wrist. Vascular recommended elevating the arm, wrapping it with ace bandage and NSAIDS for inflammation. Follow up with Dr. Johnson in 1 week at his office. Call [phone]    a Transthoracic ECHO was done by cardiology to evaluate your heart function  the following results were shown    1. Mild symmetric left ventricular hypertrophy.   2. Normal left ventricular size and systolic function.   3. Grade II left ventricular diastolic dysfunction.   4. Normal right ventricular size and systolic function.   5. Mildly dilated left atrium.   6.Mild aortic stenosis. Peak transvalvular velocity is 2.90 m/s, the mean transvalvular gradient is 20.00 mmHg, and the LVOT/AV velocity ratio is 0.55. Peak transaortic gradient is 33.64 mmHg. Aortic valve area (estimated via the continuity method) is 1.66 cm².   7. Mild aortic regurgitation.   8. Mild mitral stenosis.   9. Mild tricuspid regurgitation.  10. Pulmonary hypertension present, pulmonary artery systolic pressure is 56 mmHg.  11. No pericardial effusion.  12. Aortic root is mildly dilated measuring 3.80 cm. 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  13. No prior echo is available for comparison.

## 2021-11-02 NOTE — DIETITIAN INITIAL EVALUATION ADULT. - ADD RECOMMEND
1. Change to soft and bite sized, rec SLP eval for safest consistency d/t reported swallowing issues 2. Diet per SLP recs, include Ensure Enlive BID (700 kcal, 40g protein, 360 mL free H2O) 3. Monitor %PO intake 4. BM and pain regimen per team 5. Monitor BMP, BG q6hrs, lytes, replete prn

## 2021-11-02 NOTE — DISCHARGE NOTE PROVIDER - NSDCMRMEDTOKEN_GEN_ALL_CORE_FT
allopurinol 100 mg oral tablet: 1 tab(s) orally once a day  amLODIPine 2.5 mg oral tablet: 1 tab(s) orally once a day  aspirin 81 mg oral tablet: home dose  held for OR  atorvastatin 20 mg oral tablet: 1 tab(s) orally once a day  ferrous sulfate 325 mg (65 mg elemental iron) oral tablet: 1 tab(s) orally once a day  finasteride 5 mg oral tablet: 1 tab(s) orally once a day  folic acid 1 mg oral tablet: 1 tab(s) orally once a day  olmesartan 40 mg oral tablet: 1 tab(s) orally once a day  olmesartan 40 mg oral tablet: 1 tab(s) orally once a day  pantoprazole 40 mg oral delayed release tablet: 1 tab(s) orally once a day  tamsulosin 0.4 mg oral capsule: 1 cap(s) orally once a day  Vitamin B12 1000 mcg oral tablet: 1 tab(s) orally once a day   acetaminophen 325 mg oral tablet: 3 tab(s) orally every 8 hours  allopurinol 100 mg oral tablet: 1 tab(s) orally once a day  amLODIPine 5 mg oral tablet: 1 tab(s) orally once a day  aspirin 325 mg oral tablet: 1 tab(s) orally 2 times a day  atorvastatin 20 mg oral tablet: 1 tab(s) orally once a day (at bedtime)  ferrous sulfate 325 mg (65 mg elemental iron) oral tablet: 1 tab(s) orally once a day  finasteride 5 mg oral tablet: 1 tab(s) orally once a day  folic acid 1 mg oral tablet: 1 tab(s) orally once a day  losartan 100 mg oral tablet: 1 tab(s) orally once a day  olmesartan 40 mg oral tablet: 1 tab(s) orally once a day  olmesartan 40 mg oral tablet: 1 tab(s) orally once a day  oxyCODONE 5 mg oral tablet: 1 tab(s) orally every 4 hours, As needed, Moderate Pain (4 - 6)  pantoprazole 40 mg oral delayed release tablet: 1 tab(s) orally once a day  senna oral tablet: 2 tab(s) orally once a day (at bedtime)  tamsulosin 0.4 mg oral capsule: 1 cap(s) orally once a day (at bedtime)  Vitamin B12 1000 mcg oral tablet: 1 tab(s) orally once a day

## 2021-11-02 NOTE — DISCHARGE NOTE PROVIDER - NSDCCPTREATMENT_GEN_ALL_CORE_FT
PRINCIPAL PROCEDURE  Procedure: Hemiarthroplasty of hip  Findings and Treatment:        PRINCIPAL PROCEDURE  Procedure: Hemiarthroplasty of hip  Findings and Treatment: fracture of the femoral neck

## 2021-11-02 NOTE — DISCHARGE NOTE PROVIDER - NSDCACTIVITY_GEN_ALL_CORE
Walking - Indoors allowed/No heavy lifting/straining/Follow Instructions Provided by your Surgical Team Do not drive or operate machinery/Walking - Indoors allowed/No heavy lifting/straining/Follow Instructions Provided by your Surgical Team

## 2021-11-02 NOTE — DISCHARGE NOTE PROVIDER - HOSPITAL COURSE
Admitted: 10/28/2021  Surgery: 10/29/2021  Heather-op Antibiotics  Pain control: Ancef  DVT prophylaxis: ASA 325mg BID  OOB/Physical Therapy  Medicine consulted  Cardiology consulted Admitted: 10/28/2021  Surgery: 10/29/2021 - left hip hemiarthroplasty   Heather-op Antibiotics  Pain control: Ancef  DVT prophylaxis: ASA 325mg BID  OOB/Physical Therapy  Medicine consulted  Cardiology consulted Admitted: 10/28/2021  Surgery: 10/29/2021 - left hip hemiarthroplasty   Heather-op Antibiotics  Pain control: Ancef  DVT prophylaxis: ASA 325mg BID  OOB/Physical Therapy  Medicine consulted  Cardiology consulted  RUE doppler for infiltrated IV

## 2021-11-02 NOTE — DISCHARGE NOTE PROVIDER - CARE PROVIDERS DIRECT ADDRESSES
[email or web address]

## 2021-11-02 NOTE — PROGRESS NOTE ADULT - SUBJECTIVE AND OBJECTIVE BOX
Ortho Note      Subjective:  Pt comfortable without complaints, pain controlled with current pain medication regimen. translation provided by daughter at bedside.   Denies CP, SOB, N/V, numbness/tingling,   Family expressed they would like to pursue home with home pt and would like a wheelchair and hospital bed for home.   Discussed with Case management.       Vital Signs Last 24 Hrs  T(C): 37 (11-02-21 @ 13:18), Max: 37 (11-02-21 @ 13:18)  T(F): 98.6 (11-02-21 @ 13:18), Max: 98.6 (11-02-21 @ 13:18)  HR: 85 (11-02-21 @ 13:18) (78 - 85)  BP: 145/63 (11-02-21 @ 13:18) (138/57 - 150/66)  BP(mean): --  RR: 15 (11-02-21 @ 13:18) (15 - 15)  SpO2: 95% (11-02-21 @ 13:18) (94% - 95%)  AVSS    Objective:    Physical Exam:  General: Pt Alert and oriented, NAD  Left hip aquacel DSG C/D/I  Pulses: +2 pedal pulses, wwp toes, cap refill less than 3 seconds  Sensation: silt intact bilateral lower extremities   Motor: EHL/FHL/TA/GS- firing        Plan of Care:  A/P: 89yMale POD#4 s/p Left hip Camacho posterior approach   - afebrile, nontoxic appearance  - Pain Control- tylenol 975mg PO Q8h, oxycodone 5mg Po Q4h prn moderate pain, Dilaudid 0.5mg Q4h prn breakthrough pain   - DVT ppx: aspirin   - PT, WBS: WBAT   bowel regimen, IS use, PPI  - appreciate Cardiology recs  - appreciate medicine recs  - Dispo- home pending pt and medical clearance    Ortho Pager [phone] Ortho Note      Subjective:  Pt comfortable without complaints, pain controlled with current pain medication regimen. translation provided by daughter at bedside.   Denies CP, SOB, N/V, numbness/tingling,   Family expressed they would like to pursue home with home pt and would like a wheelchair and hospital bed for home.   Discussed with Case management.   Patient family requesting COvid-19 booster.       Vital Signs Last 24 Hrs  T(C): 37 (11-02-21 @ 13:18), Max: 37 (11-02-21 @ 13:18)  T(F): 98.6 (11-02-21 @ 13:18), Max: 98.6 (11-02-21 @ 13:18)  HR: 85 (11-02-21 @ 13:18) (78 - 85)  BP: 145/63 (11-02-21 @ 13:18) (138/57 - 150/66)  BP(mean): --  RR: 15 (11-02-21 @ 13:18) (15 - 15)  SpO2: 95% (11-02-21 @ 13:18) (94% - 95%)  AVSS    Objective:    Physical Exam:  General: Pt Alert and oriented, NAD  Left hip aquacel DSG C/D/I  Pulses: +2 pedal pulses, wwp toes, cap refill less than 3 seconds  Sensation: silt intact bilateral lower extremities   Motor: EHL/FHL/TA/GS- firing        Plan of Care:  A/P: 89yMale POD#4 s/p Left hip Camacho posterior approach   - afebrile, nontoxic appearance  - Pain Control- tylenol 975mg PO Q8h, oxycodone 5mg Po Q4h prn moderate pain, Dilaudid 0.5mg Q4h prn breakthrough pain   - DVT ppx: aspirin   - PT, WBS: WBAT   bowel regimen, IS use, PPI  - appreciate Cardiology recs  - appreciate medicine recs  - Covid -19 booster  - Dispo- home pending pt and medical clearance    Ortho Pager [phone] Ortho Note      Subjective:  Pt comfortable without complaints, pain controlled with current pain medication regimen. translation provided by daughter at bedside.   Denies CP, SOB, N/V, numbness/tingling,   Family expressed they would like to pursue home with home pt and would like a wheelchair and hospital bed for home.   Discussed with Case management.   Patient family requesting COvid-19 booster.       Vital Signs Last 24 Hrs  T(C): 37 (11-02-21 @ 13:18), Max: 37 (11-02-21 @ 13:18)  T(F): 98.6 (11-02-21 @ 13:18), Max: 98.6 (11-02-21 @ 13:18)  HR: 85 (11-02-21 @ 13:18) (78 - 85)  BP: 145/63 (11-02-21 @ 13:18) (138/57 - 150/66)  BP(mean): --  RR: 15 (11-02-21 @ 13:18) (15 - 15)  SpO2: 95% (11-02-21 @ 13:18) (94% - 95%)  AVSS    Objective:    Physical Exam:  General: Pt Alert and oriented, NAD  Left hip aquacel DSG C/D/I  Pulses: +2 pedal pulses, wwp toes, cap refill less than 3 seconds  Sensation: silt intact bilateral lower extremities   Motor: EHL/FHL/TA/GS- firing        Plan of Care:  A/P: 89yMale POD#4 s/p Left hip Camacho posterior approach   - afebrile, nontoxic appearance  - Pain Control- tylenol 975mg PO Q8h, oxycodone 5mg Po Q4h prn moderate pain, Dilaudid 0.5mg Q4h prn breakthrough pain   - DVT ppx: aspirin   - PT, WBS: WBAT   bowel regimen, IS use, PPI  - appreciate Cardiology recs  - appreciate medicine recs  - Covid -19 booster  - CKD- continue to trend creatinine and BUN- improving  - Dispo- home pending pt and medical clearance    Ortho Pager [phone]

## 2021-11-02 NOTE — DISCHARGE NOTE PROVIDER - PROVIDER TOKENS
PROVIDER:[TOKEN:[[id number]],FOLLOWUP:[2 weeks]] PROVIDER:[TOKEN:[[id number]],FOLLOWUP:[2 weeks]],PROVIDER:[TOKEN:[[id number]],FOLLOWUP:[1 week]]

## 2021-11-02 NOTE — DIETITIAN INITIAL EVALUATION ADULT. - OTHER INFO
89yMale with HTN, CKD (baseline Cr 1.6) who presents with L FNF s/p mechanical fall, now s/p L hip hemiarthroplasty.     On assessment, pt seen in room with daughter at bedside. Denies any changes in wt, no wt loss reported. Per daughter report, pt has been having fair appetite. Reports pt to be missing teeth and having trouble chewing food so have been ordering softer consistency and cutting up food for pt consumption. Previous day, pt eating scrambled eggs and oatmeal, though have episodes of coughing. Per daughter, unsure if this d/t positioning when eating or swallowing issues and would like eval. D/w team. Reports using ensure powders to mix with drinks, amenable to receiving Ensures at current adm. Encouraged adequate PO and lean protein as able. No reported pain. GI: last BM 11/1. Skin: Gerardo 18, surgical incision noted. Edema 1+ L hip. Please see below for RD recs. RD to continue to follow per protocol. 89yMale with HTN, CKD (baseline Cr 1.6) who presents with L FNF s/p mechanical fall, now s/p L hip hemiarthroplasty.     On assessment, pt seen in room with daughter at bedside. Denies any changes in wt, no wt loss reported. Per daughter report, pt has been having fair appetite. Reports pt to be missing teeth and having trouble chewing food so have been ordering softer consistency and cutting up food for pt consumption. Previous day, pt eating scrambled eggs and oatmeal, though have episodes of coughing. Per daughter, unsure if this d/t positioning when eating or swallowing issues and would like eval. Paged team. Reports using ensure powders to mix with drinks, amenable to receiving Ensures at current adm. Encouraged adequate PO and lean protein as able. No reported pain. GI: last BM 11/1. Skin: Gerardo 18, surgical incision noted. Edema 1+ L hip. Please see below for RD recs. RD to continue to follow per protocol.

## 2021-11-03 LAB
ANION GAP SERPL CALC-SCNC: 10 MMOL/L — SIGNIFICANT CHANGE UP (ref 5–17)
BUN SERPL-MCNC: 25 MG/DL — HIGH (ref 7–23)
CALCIUM SERPL-MCNC: 8.3 MG/DL — LOW (ref 8.4–10.5)
CHLORIDE SERPL-SCNC: 103 MMOL/L — SIGNIFICANT CHANGE UP (ref 96–108)
CO2 SERPL-SCNC: 23 MMOL/L — SIGNIFICANT CHANGE UP (ref 22–31)
CREAT SERPL-MCNC: 1.67 MG/DL — HIGH (ref 0.5–1.3)
GLUCOSE SERPL-MCNC: 122 MG/DL — HIGH (ref 70–99)
HCT VFR BLD CALC: 25.6 % — LOW (ref 39–50)
HGB BLD-MCNC: 8.4 G/DL — LOW (ref 13–17)
MAGNESIUM SERPL-MCNC: 1.9 MG/DL — SIGNIFICANT CHANGE UP (ref 1.6–2.6)
MCHC RBC-ENTMCNC: 32.2 PG — SIGNIFICANT CHANGE UP (ref 27–34)
MCHC RBC-ENTMCNC: 32.8 GM/DL — SIGNIFICANT CHANGE UP (ref 32–36)
MCV RBC AUTO: 98.1 FL — SIGNIFICANT CHANGE UP (ref 80–100)
NRBC # BLD: 0 /100 WBCS — SIGNIFICANT CHANGE UP (ref 0–0)
PLATELET # BLD AUTO: 116 K/UL — LOW (ref 150–400)
POTASSIUM SERPL-MCNC: 3.9 MMOL/L — SIGNIFICANT CHANGE UP (ref 3.5–5.3)
POTASSIUM SERPL-SCNC: 3.9 MMOL/L — SIGNIFICANT CHANGE UP (ref 3.5–5.3)
RBC # BLD: 2.61 M/UL — LOW (ref 4.2–5.8)
RBC # FLD: 15 % — HIGH (ref 10.3–14.5)
SODIUM SERPL-SCNC: 136 MMOL/L — SIGNIFICANT CHANGE UP (ref 135–145)
WBC # BLD: 6.74 K/UL — SIGNIFICANT CHANGE UP (ref 3.8–10.5)
WBC # FLD AUTO: 6.74 K/UL — SIGNIFICANT CHANGE UP (ref 3.8–10.5)

## 2021-11-03 PROCEDURE — 99233 SBSQ HOSP IP/OBS HIGH 50: CPT

## 2021-11-03 RX ORDER — MAGNESIUM SULFATE 500 MG/ML
2 VIAL (ML) INJECTION ONCE
Refills: 0 | Status: COMPLETED | OUTPATIENT
Start: 2021-11-03 | End: 2021-11-03

## 2021-11-03 RX ADMIN — TAMSULOSIN HYDROCHLORIDE 0.4 MILLIGRAM(S): 0.4 CAPSULE ORAL at 22:15

## 2021-11-03 RX ADMIN — FINASTERIDE 5 MILLIGRAM(S): 5 TABLET, FILM COATED ORAL at 12:47

## 2021-11-03 RX ADMIN — Medication 50 GRAM(S): at 15:17

## 2021-11-03 RX ADMIN — Medication 975 MILLIGRAM(S): at 07:30

## 2021-11-03 RX ADMIN — Medication 100 MILLIGRAM(S): at 12:47

## 2021-11-03 RX ADMIN — Medication 975 MILLIGRAM(S): at 22:14

## 2021-11-03 RX ADMIN — Medication 975 MILLIGRAM(S): at 15:17

## 2021-11-03 RX ADMIN — AMLODIPINE BESYLATE 5 MILLIGRAM(S): 2.5 TABLET ORAL at 06:30

## 2021-11-03 RX ADMIN — LOSARTAN POTASSIUM 100 MILLIGRAM(S): 100 TABLET, FILM COATED ORAL at 06:38

## 2021-11-03 RX ADMIN — ATORVASTATIN CALCIUM 20 MILLIGRAM(S): 80 TABLET, FILM COATED ORAL at 06:30

## 2021-11-03 RX ADMIN — Medication 975 MILLIGRAM(S): at 16:05

## 2021-11-03 RX ADMIN — Medication 325 MILLIGRAM(S): at 19:09

## 2021-11-03 RX ADMIN — PANTOPRAZOLE SODIUM 40 MILLIGRAM(S): 20 TABLET, DELAYED RELEASE ORAL at 06:39

## 2021-11-03 RX ADMIN — ATORVASTATIN CALCIUM 20 MILLIGRAM(S): 80 TABLET, FILM COATED ORAL at 22:15

## 2021-11-03 RX ADMIN — Medication 975 MILLIGRAM(S): at 06:38

## 2021-11-03 RX ADMIN — Medication 975 MILLIGRAM(S): at 22:40

## 2021-11-03 RX ADMIN — Medication 325 MILLIGRAM(S): at 06:37

## 2021-11-03 NOTE — OCCUPATIONAL THERAPY INITIAL EVALUATION ADULT - RANGE OF MOTION EXAMINATION, LOWER EXTREMITY
except R hip flexion 2/2 posterior precautions/bilateral LE Active ROM was WFL  (within functional limits)

## 2021-11-03 NOTE — PROGRESS NOTE ADULT - SUBJECTIVE AND OBJECTIVE BOX
INTERVAL HPI/OVERNIGHT EVENTS: ANA O/N    SUBJECTIVE: Patient seen and examined at bedside.   Daughter at bedside serving as     Pt denies any pain. Getting up w PT and not feeling any dizziness. Denies any pain in leg. No numbness, fever, cough, dyspnea. Eating wo nausea, abd pain. +BM. Voiding wo issues.    Titrated to room air - pt satting 96-98%  Family has made their intent clear they would very much to take the patient and care for them at home. We have discussed, as previously, that if the patient should require 2 person assist - this could become challenging necessitating private hire aides. Family open to considering [facility].      OBJECTIVE:    VITAL SIGNS:  ICU Vital Signs Last 24 Hrs  T(C): 36.5 (03 Nov 2021 13:29), Max: 37.1 (02 Nov 2021 17:27)  T(F): 97.7 (03 Nov 2021 13:29), Max: 98.8 (02 Nov 2021 17:27)  HR: 76 (03 Nov 2021 13:29) (72 - 78)  BP: 122/60 (03 Nov 2021 13:29) (122/60 - 163/60)  BP(mean): 92 (03 Nov 2021 12:07) (92 - 98)  ABP: --  ABP(mean): --  RR: 17 (03 Nov 2021 13:29) (14 - 17)  SpO2: 95% (03 Nov 2021 13:29) (95% - 98%)        11-02 @ 07:01  -  11-03 @ 07:00  --------------------------------------------------------  IN: 480 mL / OUT: 600 mL / NET: -120 mL      CAPILLARY BLOOD GLUCOSE          PHYSICAL EXAM:  GEN: Male in NAD on RA  HEENT: NC/AT, MMM  CV: RRR, nml S1S2, no murmurs  PULM: nml effort, CTAB wo rales, rhonchi, or wheezing  ABD: Soft, non-distended, NABS, non-tender  NEURO  A/O x3, moving all extremities, Sensation intact  4/5 in L hip and knee limited 2/2 pain. L thigh dressing c/d/i. Does not appear to have hematoma.  PSYCH: Appropriate    MEDICATIONS:  MEDICATIONS  (STANDING):  acetaminophen     Tablet .. 975 milliGRAM(s) Oral every 8 hours  allopurinol 100 milliGRAM(s) Oral daily  amLODIPine   Tablet 5 milliGRAM(s) Oral daily  aspirin 325 milliGRAM(s) Oral two times a day  atorvastatin 20 milliGRAM(s) Oral at bedtime  finasteride 5 milliGRAM(s) Oral daily  losartan 100 milliGRAM(s) Oral daily  magnesium sulfate  IVPB 2 Gram(s) IV Intermittent once  pantoprazole    Tablet 40 milliGRAM(s) Oral before breakfast  senna 2 Tablet(s) Oral at bedtime  tamsulosin 0.4 milliGRAM(s) Oral at bedtime    MEDICATIONS  (PRN):  magnesium hydroxide Suspension 30 milliLiter(s) Oral daily PRN Constipation  melatonin 3 milliGRAM(s) Oral at bedtime PRN Insomnia  oxyCODONE    IR 5 milliGRAM(s) Oral every 4 hours PRN Moderate Pain (4 - 6)      ALLERGIES:  Allergies    No Known Allergies    Intolerances        LABS:                        8.4    6.74  )-----------( 116      ( 03 Nov 2021 07:28 )             25.6     11-03    136  |  103  |  25<H>  ----------------------------<  122<H>  3.9   |  23  |  1.67<H>    Ca    8.3<L>      03 Nov 2021 07:28  Mg     1.9     11-03            RADIOLOGY & ADDITIONAL TESTS: Reviewed.

## 2021-11-03 NOTE — DIETITIAN INITIAL EVALUATION ADULT. - PERSON TAUGHT/METHOD
Group Topic: BH Coping Skills Education    Date: 11/3/2021  Start Time: 10:00 AM  End Time: 10:50 AM  Facilitators: Bharati Almonte LPC; Allen Wood    Focus: Problem Solving-Cognitive Distortions  Number in attendance: 6    Group discussed what cognitive distortions are as well as discussed examples of the different types of thinking errors.    Method: Group  Attendance: Present  Participation: Moderate  Patient Response: Attentive, Good eye contact and Quiet     Pt was attentive and gave good eye contact, but was quiet overall. She did not contribute to group discussion.    Bharati Almonte LPC  11/3/21         Encourage PO and lean protein/verbal instruction/patient instructed/daughter instructed

## 2021-11-03 NOTE — PROGRESS NOTE ADULT - SUBJECTIVE AND OBJECTIVE BOX
POST OPERATIVE DAY #5 left hip martina with Dr. Mayorga   SUBJECTIVE: Patient seen and examined.  Pt without complaints. Refusing to go to recommended [facility], instead wishes to plan for discharge home.   Denies chest pain/SOB/dizziness/n/v/HA   Pain well controlled.     Seen with Dr. Mayorga at bedside.     OBJECTIVE:   Vital Signs Last 24 Hrs  T(C): 36.5 (03 Nov 2021 13:29), Max: 37.1 (02 Nov 2021 17:27)  T(F): 97.7 (03 Nov 2021 13:29), Max: 98.8 (02 Nov 2021 17:27)  HR: 76 (03 Nov 2021 13:29) (72 - 78)  BP: 122/60 (03 Nov 2021 13:29) (122/60 - 163/60)  BP(mean): 92 (03 Nov 2021 12:07) (92 - 98)  RR: 17 (03 Nov 2021 13:29) (14 - 17)  SpO2: 95% (03 Nov 2021 13:29) (95% - 98%)    PE:   Comfortable, NAD, alert, oriented, pleasant, visiting with family          Dressing: clean/dry/intact aquacel          Sensation: intact to light touch to patient's baseline BLE          Motor exam:  firing ehl/ta/gs/fhl 5/5 BLE          Skin warm, well-perfused; capillary refill brisk BLE             LABS:              8.4    6.74  )-----------( 116      ( 03 Nov 2021 07:28 )             25.6     11-03    136  |  103  |  25<H>  ----------------------------<  122<H>  3.9   |  23  |  1.67<H>    Ca    8.3<L>      03 Nov 2021 07:28  Mg     1.9     11-03      ASSESSMENT AND PLAN: POD 5 left hip hemiarthroplasty   1. Stable. Labs reviewed.   -Repleting mag for 1.9/PAT overnight, will monitor, continue telemetry   - AM labs ordered  - Appreciate speech and swallow eval, minced/moist diet ordered   2. Analgesic pain control  3. DVT prophylaxis: SCDs,  BID   4. Weight Bearing Status:  WBAT   5. Disposition: Refusing [facility], wants home with help, 2 person assist for bed mobility at this time- pt not able to accommodate 2 ppl at home until Friday , SW aware, attg aware, family aware to plan appropriately

## 2021-11-03 NOTE — OCCUPATIONAL THERAPY INITIAL EVALUATION ADULT - NS ASR OT EQUIP NEEDS DISCH
Patient owns a shower chair, estela, SC, wants an hospital bed and RWAnmol MOTLEY and PA made aware. Patient owns a shower chair, estela, SC, wants an hospital bed and w/c- CM and PA made aware.

## 2021-11-03 NOTE — PROGRESS NOTE ADULT - SUBJECTIVE AND OBJECTIVE BOX
Ortho Note    Pt comfortable without complaints, pain controlled. Family requesting hospital bed for home and wheelchair. Speech and swallow consulted as per dietician - patient coughing when swallowing food.  Denies CP, SOB, N/V, numbness/tingling down le b/l.    Vital Signs Last 24 Hrs  T(C): 36.5 (03 Nov 2021 05:30), Max: 37.1 (02 Nov 2021 17:27)  T(F): 97.7 (03 Nov 2021 05:30), Max: 98.8 (02 Nov 2021 17:27)  HR: 73 (03 Nov 2021 09:28) (72 - 85)  BP: 161/68 (03 Nov 2021 09:28) (143/67 - 163/60)  BP(mean): 98 (03 Nov 2021 09:28) (98 - 98)  RR: 16 (03 Nov 2021 09:28) (14 - 16)  SpO2: 98% (03 Nov 2021 09:28) (94% - 98%)    General: Pt Alert and oriented, NAD  DSG prevena L hip C/D/I holding suction  Pulses:  DPs palpable b/l   Sensation: SILT throughout le b/l   Motor: quad/hamstring/EHL/FHL/TA/GS 5/5 b/l le       A/P: 89yMale POD#5 s/p L hip hemiarthroplasty   - Stable  - Pain Control prn   - DVT ppx: asa  - PT, WBS:  WBAT le b/l   - trend labs   - Medicine and cardiology following will continue to appreciate recs  - [facility]      Ortho Pager [phone]

## 2021-11-03 NOTE — SWALLOW BEDSIDE ASSESSMENT ADULT - COMMENTS
Pt awake, alert, Cantonese speaking, following commands. Daughter at the bedside assisted with translation and reports Pt is not a reliable historian.

## 2021-11-03 NOTE — OCCUPATIONAL THERAPY INITIAL EVALUATION ADULT - PERSONAL SAFETY AND JUDGMENT, REHAB EVAL
Problem: Intellectual/Education/Knowledge Deficit  Goal: Teaching initiated upon admission  Description  Patient instructed on therapeutic phlebotomy procedure and potential complications. Consent signed. Aware to call MD if complications occur. Outcome: Met This Shift  Note:   Understands procedure on hold today due to blood counts. Intervention: Verbal/written education provided  Note:   Review lab results- return in one month     Problem: Discharge Planning  Goal: Knowledge of discharge instructions  Description  Knowledge of discharge instructions     Outcome: Met This Shift  Note:   Verbalized understanding of discharge instructions, follow-up appointments, and when to call the physician. Intervention: Interaction with patient/family and care team  Note:   Discuss understanding of discharge instructions,follow-up appointments, and when to call the physician. Care plan reviewed with patient . Patient  verbalize understanding of the plan of care and contribute to goal setting. intact

## 2021-11-03 NOTE — OCCUPATIONAL THERAPY INITIAL EVALUATION ADULT - ADDITIONAL COMMENTS
Patients daughter's at bedside to assist with English<>Cantonese translation, patient lives with daughter in an elevator access apartment building with 1 LEVI and b/l handrails. Patient was independent with all ADL's and functional mobility with SC prior to admission. Patient has a w/c he used for long distances however it is broken. Patient's daughter at home now but has to return to work 1 day a week. Patient owns a shower chair, commode, and grab bars at home.

## 2021-11-03 NOTE — SWALLOW BEDSIDE ASSESSMENT ADULT - NS SPL SWALLOW CLINIC TRIAL FT
Pt presents with essentially functional jim-pharyngeal swallow on this exam with no overt signs of airway protection deficits. Given incomplete dentition and prolonged mastication of soft solids, Pt would benefit from a minced and moist diet.

## 2021-11-03 NOTE — OCCUPATIONAL THERAPY INITIAL EVALUATION ADULT - GENERAL OBSERVATIONS, REHAB EVAL
Patient primarily Cantonese speaking, 2 daughters present to assist with English<>Cantonese translation throughout session. Patient A&Ox4, agreeable and tolerated session fairly. Patient received semisupine in bed +tele, +heplock IV, +Abductor pillow, +b/l SCD's, +Prevena,  room air, NAD.

## 2021-11-03 NOTE — OCCUPATIONAL THERAPY INITIAL EVALUATION ADULT - ANTICIPATED DISCHARGE DISPOSITION, OT EVAL
Subacute rehab Home with home OT and 24 assistance from family Subacute rehab- if patient were to go home will require 2 person assist for bed mobility and home OT.

## 2021-11-03 NOTE — PROGRESS NOTE ADULT - SUBJECTIVE AND OBJECTIVE BOX
Patient will need a wheelchair and hospital bed for home use due to his diagnosis of left femoral neck fracture status post left hip martina-arthroplasty. The patient has mobility limitations that can't be sufficiently resolved by the use of an appropriately fitted cane or walker as well as a regular bed. Without the wheelchair the patient will not be able to participate in [facility]AD[facility]. The family and patient have expressed willingness to use a wheelchair in the home. The beneficiary has a caregiver/family member who is willing and able to provide assistance with the wheelchair/hospital bed.     Tracy Evans PA-C

## 2021-11-03 NOTE — OCCUPATIONAL THERAPY INITIAL EVALUATION ADULT - MD ORDER
89yMale with HTN and CKD (baseline Cr 1.6) who presents with a L femoral neck fracture status post mechanical fall. Pt slipped while trying on slippers and landed on his left side. Denies LOC, head strike. Denies using blood thinners. Pt was transferred from [facility] for preoperative evaluation. Denies numbness/tingling of extremities.

## 2021-11-03 NOTE — OCCUPATIONAL THERAPY INITIAL EVALUATION ADULT - DIAGNOSIS, OT EVAL
Patient POD #5 s/p L hip hemiarthroplasty, presents with posterior hip precautions, decreased overall balance, core strength, trunk control, strength, and activity tolerance impacting independence with functional activities.

## 2021-11-03 NOTE — PROGRESS NOTE ADULT - ASSESSMENT
89M w h/o CKD (1.6), BPH, HTN, Gout, cholelithiasis w procedures at [facility] previously, p/w mechanical fall onto L side found to have L FNF - s/p L Hemiarthroplasty 10/29 w Dr. Mayorga, s/p 1u RBC on 10/30 and 10/31    #Post-op state. Pain controlled. On  BID for ppx. On bowel regimen and incentive spirometer  #HTN - Bp 120-140s. Improved. Possibly pain w PT. Home regimen as follows: Amlodipine 2.5 (improved to 5mg), losartan 100  #Blood loss anemia -Stable today 8.4 from 8.3. s/p 1u 10/31. Also 1u 10/30. Pt was 9.7 on baseline  #Thrombocytopenia - Improved 116 from 105. 135 at admission. Possible dilution effect. Continue to monitor  #hyponatremia - resolved.  #CKDIII - Cr stable 1.67 today. (1.6 baseline)  #HLD - c/w home statin  #BPH - on finasteride and flomax at home    Recommendations  Increase amlodipine to 5mg today. Suspect pain during PT. Recommend pre-medication prior to PT sessions  CBC w diff in AM  Encourage OOB to chair if able. Encourage incentive spirometer use  Titrate nasal canula to sat > 90%  Add on iron studies, ferritin, reticulocyte    F/U CBC w diff as outpatient regarding anemai  -Caution w NSAIDs post op due to CKD    DISPO: [facility] vs HPT pending progress. Recommend dual plan Home w HHA/HPT, family assist and [facility] (especially if patient is 2 person assist on PT eval today)

## 2021-11-04 LAB
ANION GAP SERPL CALC-SCNC: 6 MMOL/L — SIGNIFICANT CHANGE UP (ref 5–17)
BUN SERPL-MCNC: 25 MG/DL — HIGH (ref 7–23)
CALCIUM SERPL-MCNC: 8.1 MG/DL — LOW (ref 8.4–10.5)
CHLORIDE SERPL-SCNC: 105 MMOL/L — SIGNIFICANT CHANGE UP (ref 96–108)
CO2 SERPL-SCNC: 25 MMOL/L — SIGNIFICANT CHANGE UP (ref 22–31)
CREAT SERPL-MCNC: 1.8 MG/DL — HIGH (ref 0.5–1.3)
GLUCOSE SERPL-MCNC: 137 MG/DL — HIGH (ref 70–99)
HCT VFR BLD CALC: 24.9 % — LOW (ref 39–50)
HGB BLD-MCNC: 8.2 G/DL — LOW (ref 13–17)
MCHC RBC-ENTMCNC: 32.8 PG — SIGNIFICANT CHANGE UP (ref 27–34)
MCHC RBC-ENTMCNC: 32.9 GM/DL — SIGNIFICANT CHANGE UP (ref 32–36)
MCV RBC AUTO: 99.6 FL — SIGNIFICANT CHANGE UP (ref 80–100)
NRBC # BLD: 0 /100 WBCS — SIGNIFICANT CHANGE UP (ref 0–0)
PLATELET # BLD AUTO: 137 K/UL — LOW (ref 150–400)
POTASSIUM SERPL-MCNC: 4.1 MMOL/L — SIGNIFICANT CHANGE UP (ref 3.5–5.3)
POTASSIUM SERPL-SCNC: 4.1 MMOL/L — SIGNIFICANT CHANGE UP (ref 3.5–5.3)
RBC # BLD: 2.5 M/UL — LOW (ref 4.2–5.8)
RBC # FLD: 15.2 % — HIGH (ref 10.3–14.5)
SARS-COV-2 RNA SPEC QL NAA+PROBE: NEGATIVE — SIGNIFICANT CHANGE UP
SODIUM SERPL-SCNC: 136 MMOL/L — SIGNIFICANT CHANGE UP (ref 135–145)
WBC # BLD: 5.51 K/UL — SIGNIFICANT CHANGE UP (ref 3.8–10.5)
WBC # FLD AUTO: 5.51 K/UL — SIGNIFICANT CHANGE UP (ref 3.8–10.5)

## 2021-11-04 PROCEDURE — 93971 EXTREMITY STUDY: CPT | Mod: 26,RT

## 2021-11-04 PROCEDURE — 99233 SBSQ HOSP IP/OBS HIGH 50: CPT

## 2021-11-04 PROCEDURE — 99221 1ST HOSP IP/OBS SF/LOW 40: CPT | Mod: GC

## 2021-11-04 RX ADMIN — Medication 100 MILLIGRAM(S): at 12:29

## 2021-11-04 RX ADMIN — Medication 975 MILLIGRAM(S): at 22:15

## 2021-11-04 RX ADMIN — Medication 325 MILLIGRAM(S): at 23:00

## 2021-11-04 RX ADMIN — Medication 975 MILLIGRAM(S): at 15:45

## 2021-11-04 RX ADMIN — TAMSULOSIN HYDROCHLORIDE 0.4 MILLIGRAM(S): 0.4 CAPSULE ORAL at 22:59

## 2021-11-04 RX ADMIN — PANTOPRAZOLE SODIUM 40 MILLIGRAM(S): 20 TABLET, DELAYED RELEASE ORAL at 06:41

## 2021-11-04 RX ADMIN — ATORVASTATIN CALCIUM 20 MILLIGRAM(S): 80 TABLET, FILM COATED ORAL at 22:59

## 2021-11-04 RX ADMIN — AMLODIPINE BESYLATE 5 MILLIGRAM(S): 2.5 TABLET ORAL at 06:41

## 2021-11-04 RX ADMIN — FINASTERIDE 5 MILLIGRAM(S): 5 TABLET, FILM COATED ORAL at 12:25

## 2021-11-04 RX ADMIN — LOSARTAN POTASSIUM 100 MILLIGRAM(S): 100 TABLET, FILM COATED ORAL at 06:41

## 2021-11-04 RX ADMIN — Medication 975 MILLIGRAM(S): at 07:00

## 2021-11-04 RX ADMIN — Medication 975 MILLIGRAM(S): at 06:41

## 2021-11-04 RX ADMIN — Medication 325 MILLIGRAM(S): at 06:41

## 2021-11-04 RX ADMIN — Medication 975 MILLIGRAM(S): at 21:59

## 2021-11-04 NOTE — CONSULT NOTE ADULT - ATTENDING COMMENTS
Case reviewed with Chief Resident.  Agree with the findings and statements as documented above.  Plan as above.

## 2021-11-04 NOTE — PROGRESS NOTE ADULT - SUBJECTIVE AND OBJECTIVE BOX
POST OPERATIVE DAY #6 left hip martina with Dr. Mayorga   SUBJECTIVE: Patient seen and examined.  Pt without complaints. Refusing to go to recommended [facility], instead wishes to plan for discharge home.   Denies chest pain/SOB/dizziness/n/v/HA   Pain well controlled.      OBJECTIVE:   Vital Signs Last 24 Hrs  T(C): 36.9 (04 Nov 2021 13:44), Max: 37.1 (03 Nov 2021 22:10)  T(F): 98.5 (04 Nov 2021 13:44), Max: 98.7 (03 Nov 2021 22:10)  HR: 87 (04 Nov 2021 13:44) (78 - 95)  BP: 120/56 (04 Nov 2021 13:44) (120/56 - 169/62)  BP(mean): --  RR: 17 (04 Nov 2021 13:44) (17 - 19)  SpO2: 96% (04 Nov 2021 13:44) (94% - 96%)    PE:   Comfortable, NAD, alert, oriented, pleasant, visiting with family          Dressing: clean/dry/intact aquacel          Sensation: intact to light touch to patient's baseline BLE          Motor exam:  firing ehl/ta/gs/fhl 5/5 BLE          Skin warm, well-perfused; capillary refill brisk BLE             LABS:                           8.2    5.51  )-----------( 137      ( 04 Nov 2021 08:28 )             24.9       ASSESSMENT AND PLAN: POD 6 left hip hemiarthroplasty   1. Stable. Labs reviewed.   -Repleting mag for 1.9/PAT overnight, will monitor, continue telemetry   - AM labs ordered  - Appreciate speech and swallow eval, minced/moist diet ordered   2. Analgesic pain control  3. DVT prophylaxis: SCDs,  BID   4. Weight Bearing Status:  WBAT   5. Disposition: Refusing [facility], wants home with help, 2 person assist for bed mobility at this time

## 2021-11-04 NOTE — CONSULT NOTE ADULT - SUBJECTIVE AND OBJECTIVE BOX
Vascular Attending:  Alex    HPI:    Pt is 89y.o M with HTN and CKD who is admitted  with a L femoral neck fracture s/p mechanical fall.  Pt underwent a L Hemiarthroplasty  10/29.  Vascular called to evaluate pt for RUE thrombus.  Pt had an IV that infiltrated in R forearm which was removed after pt complained of pain.  Pt denies pain at this time, numbness or tingling.     REVIEW OF SYSTEMS  Neg except as in HPI    MEDICATIONS  (STANDING):  acetaminophen     Tablet .. 975 milliGRAM(s) Oral every 8 hours  allopurinol 100 milliGRAM(s) Oral daily  amLODIPine   Tablet 5 milliGRAM(s) Oral daily  aspirin 325 milliGRAM(s) Oral two times a day  atorvastatin 20 milliGRAM(s) Oral at bedtime  finasteride 5 milliGRAM(s) Oral daily  losartan 100 milliGRAM(s) Oral daily  pantoprazole    Tablet 40 milliGRAM(s) Oral before breakfast  senna 2 Tablet(s) Oral at bedtime  tamsulosin 0.4 milliGRAM(s) Oral at bedtime    MEDICATIONS  (PRN):  magnesium hydroxide Suspension 30 milliLiter(s) Oral daily PRN Constipation  melatonin 3 milliGRAM(s) Oral at bedtime PRN Insomnia  oxyCODONE    IR 5 milliGRAM(s) Oral every 4 hours PRN Moderate Pain (4 - 6)      Allergies  No Known Allergies    Vital Signs Last 24 Hrs  T(C): 36.7 (05 Nov 2021 01:07), Max: 36.9 (04 Nov 2021 13:44)  T(F): 98 (05 Nov 2021 01:07), Max: 98.5 (04 Nov 2021 13:44)  HR: 74 (05 Nov 2021 01:07) (74 - 87)  BP: 161/60 (05 Nov 2021 01:07) (120/56 - 175/62)  BP(mean): --  RR: 18 (05 Nov 2021 01:07) (16 - 18)  SpO2: 94% (05 Nov 2021 01:07) (94% - 96%)    PHYSICAL EXAM:    Constitutional: PT AXOX3 in NAD    Respiratory: Unlabored    Cardiovascular: S1S2    Extremities: mild swelling to RUE, no pain or discoloration    Vascular: palp radial/ulnar    Neurological: intact      LABS:                        8.2    5.51  )-----------( 137      ( 04 Nov 2021 08:28 )             24.9     11-04    136  |  105  |  25<H>  ----------------------------<  137<H>  4.1   |  25  |  1.80<H>    Ca    8.1<L>      04 Nov 2021 08:28  Mg     1.9     11-03            RADIOLOGY & ADDITIONAL STUDIES

## 2021-11-04 NOTE — CONSULT NOTE ADULT - ASSESSMENT
89y.o M s/p L Hemiarthroplasty after L femoral neck Fx pod##6 with R cephalic vein thrombus of wrist.    -No Acute Vascular Intervention  -Recommend Ace wrap of forearm  elevation and Nsaids   If increasing pain, tightness or loss of motor/sensory   function reconsult vascular

## 2021-11-04 NOTE — PROGRESS NOTE ADULT - SUBJECTIVE AND OBJECTIVE BOX
INTERVAL HPI/OVERNIGHT EVENTS: ANA O/N    SUBJECTIVE: Patient seen and examined at bedside.   Daughter at bedside. Satting 94-96% on RA  Pt reports some pain at RUE IV site. No numbness in RUE but states it is swollen and more difficult to handle utensils to eat. No pain in L hip. Denies any fever, LH/Dizziness, chest pain, dyspnea. No nausea, abd pain. Voiding wo issues. Had BM.     OBJECTIVE:    VITAL SIGNS:  ICU Vital Signs Last 24 Hrs  T(C): 36.9 (04 Nov 2021 13:44), Max: 37.1 (03 Nov 2021 22:10)  T(F): 98.5 (04 Nov 2021 13:44), Max: 98.7 (03 Nov 2021 22:10)  HR: 87 (04 Nov 2021 13:44) (78 - 95)  BP: 120/56 (04 Nov 2021 13:44) (120/56 - 169/62)  BP(mean): --  ABP: --  ABP(mean): --  RR: 17 (04 Nov 2021 13:44) (17 - 19)  SpO2: 96% (04 Nov 2021 13:44) (94% - 96%)        11-03 @ 07:01 - 11-04 @ 07:00  --------------------------------------------------------  IN: 360 mL / OUT: 500 mL / NET: -140 mL    11-04 @ 07:01 - 11-04 @ 15:49  --------------------------------------------------------  IN: 120 mL / OUT: 500 mL / NET: -380 mL      CAPILLARY BLOOD GLUCOSE          PHYSICAL EXAM:  GEN: Male in NAD on RA  HEENT: NC/AT, MMM  CV: RRR, nml S1S2, no murmurs  PULM: nml effort, CTAB wo rales, rhonchi, or wheezing  ABD: Soft, non-distended, NABS, non-tender  NEURO  A/O x3, moving all extremities, Sensation intact  4/5 in L hip and knee limited 2/2 pain. L thigh dressing c/d/i. Does not appear to have hematoma.  There is tenderness around R wrist IV. Soft tissue edema present in R arm and forearm wo erythema, induration.   PSYCH: Appropriate      MEDICATIONS:  MEDICATIONS  (STANDING):  acetaminophen     Tablet .. 975 milliGRAM(s) Oral every 8 hours  allopurinol 100 milliGRAM(s) Oral daily  amLODIPine   Tablet 5 milliGRAM(s) Oral daily  aspirin 325 milliGRAM(s) Oral two times a day  atorvastatin 20 milliGRAM(s) Oral at bedtime  finasteride 5 milliGRAM(s) Oral daily  losartan 100 milliGRAM(s) Oral daily  pantoprazole    Tablet 40 milliGRAM(s) Oral before breakfast  senna 2 Tablet(s) Oral at bedtime  tamsulosin 0.4 milliGRAM(s) Oral at bedtime    MEDICATIONS  (PRN):  magnesium hydroxide Suspension 30 milliLiter(s) Oral daily PRN Constipation  melatonin 3 milliGRAM(s) Oral at bedtime PRN Insomnia  oxyCODONE    IR 5 milliGRAM(s) Oral every 4 hours PRN Moderate Pain (4 - 6)      ALLERGIES:  Allergies    No Known Allergies    Intolerances        LABS:                        8.2    5.51  )-----------( 137      ( 04 Nov 2021 08:28 )             24.9     11-04    136  |  105  |  25<H>  ----------------------------<  137<H>  4.1   |  25  |  1.80<H>    Ca    8.1<L>      04 Nov 2021 08:28  Mg     1.9     11-03            RADIOLOGY & ADDITIONAL TESTS: Reviewed.

## 2021-11-04 NOTE — CHART NOTE - NSCHARTNOTEFT_GEN_A_CORE
Admitting Diagnosis:   Patient is a 89y old  Male who presents with a chief complaint of L FNF (03 Nov 2021 14:39)      PAST MEDICAL & SURGICAL HISTORY:  HTN (hypertension)    Chronic kidney disease, unspecified CKD stage        Current Nutrition Order:   Regular diet    PO Intake: Good (%) [   ]  Fair (50-75%) [   ] Poor (<25%) [   ] -- Per daughter, approximates a little less than 50%    GI Issues: No reported n/v. Last BM 11/3, small loose stools    Pain: No reported pain.    Skin Integrity: Gerardo 18, surgical incision noted. Edema 1+, generalized & @ L hip    Labs:   11-04    136  |  105  |  25<H>  ----------------------------<  137<H>  4.1   |  25  |  1.80<H>    Ca    8.1<L>      04 Nov 2021 08:28  Mg     1.9     11-03      CAPILLARY BLOOD GLUCOSE          Medications:  MEDICATIONS  (STANDING):  acetaminophen     Tablet .. 975 milliGRAM(s) Oral every 8 hours  allopurinol 100 milliGRAM(s) Oral daily  amLODIPine   Tablet 5 milliGRAM(s) Oral daily  aspirin 325 milliGRAM(s) Oral two times a day  atorvastatin 20 milliGRAM(s) Oral at bedtime  finasteride 5 milliGRAM(s) Oral daily  losartan 100 milliGRAM(s) Oral daily  pantoprazole    Tablet 40 milliGRAM(s) Oral before breakfast  senna 2 Tablet(s) Oral at bedtime  tamsulosin 0.4 milliGRAM(s) Oral at bedtime    MEDICATIONS  (PRN):  magnesium hydroxide Suspension 30 milliLiter(s) Oral daily PRN Constipation  melatonin 3 milliGRAM(s) Oral at bedtime PRN Insomnia  oxyCODONE    IR 5 milliGRAM(s) Oral every 4 hours PRN Moderate Pain (4 - 6)    Adm Anthropometrics:  · Height for BMI (FEET)	5 Feet  · Height for BMI (INCHES)	3 Inch(s)  · Height for BMI (CENTIMETERS)	160.02 Centimeter(s)  · Weight for BMI (lbs)	150 lb  · Weight for BMI (kg)	68 kg  · Body Mass Index	26.5  · Ideal Body Weight (lbs)	124  · Ideal Body Weight (kg)	56.2    Weight Change: No new wts in EMR. Please obtain wts weekly to trend/assess for changes    Nutrition Focused Physical Exam: Completed [   ]  Not Pertinent [ x  ]    Estimated energy needs:   5017-9465 kcals (25-30 kcals/kg, ABW)  81.6-95.2 g protein (1.2-1.4 g/kg, ABW)  8601-4611 mls (30-35 mls/kg, ABW)    ABW used to calculate energy needs due to pt's current body weight within % IBW (120%). Increased needs 2/2 s/p Sx    Subjective:   89yMale with HTN, CKD (baseline Cr 1.6) who presents with L FNF s/p mechanical fall, now s/p L hip hemiarthroplasty on 10/29. S/p SLP eval, recommending mince and moist diet d/t decrease dentition and prolonged mastication. Pt medically cleared for d/c, considering dual plan for discharge planning per  note.    On follow up assessment, pt seen sleeping in bed, daughter at bedside. Pt currently following regular diet at the moment, daughter reporting pt to continue eating soft foods, ate salmon and sweet potatoes last night, few bites of rice d/t reported to be hard. Daughter estimated intake to be a little less than 50%. Reports pt to have drank 1 Ensure Enlive previous day. Encouraged adequate PO intake and lean protein, daughter verbalized understanding. No other complaints at this time. RD to follow per protocol.    Previous Nutrition Diagnosis:  Nutrition Diagnostic Terminology #1 Increased Nutrient Needs...     Etiology RT hypermetabolic state.     Signs/Symptoms AEB s/p Sx.     Goal/Expected Outcome Consistently meet >75% EER via adequate PO.    Active [ x  ]  Resolved [   ]    If resolved, new PES:     Recommendations:  1. Change diet to minced and moist diet, consistency per SLP recs, include Ensure Enlive BID (700 kcal, 40g protein, 360 mL free H2O)  2. Monitor %PO intake, encourage during meal times  3. BM regimen per team  4. monitor BMP, BG, lytes, replete prn    Education: Continued encourage PO and lean protein sources    Risk Level: High [   ] Moderate [ x  ] Low [   ]

## 2021-11-04 NOTE — PROGRESS NOTE ADULT - SUBJECTIVE AND OBJECTIVE BOX
POST OPERATIVE DAY # 6 left hip martina with Dr. Mayorga   SUBJECTIVE: Patient seen and examined.  Pt without complaints.   Denies chest pain/SOB/dizziness/n/v/HA   Pain well controlled.       OBJECTIVE:     Vital Signs Last 24 Hrs  T(C): 36.9 (04 Nov 2021 13:44), Max: 37.1 (03 Nov 2021 22:10)  T(F): 98.5 (04 Nov 2021 13:44), Max: 98.7 (03 Nov 2021 22:10)  HR: 87 (04 Nov 2021 13:44) (78 - 95)  BP: 120/56 (04 Nov 2021 13:44) (120/56 - 169/62)  BP(mean): --  RR: 17 (04 Nov 2021 13:44) (17 - 19)  SpO2: 96% (04 Nov 2021 13:44) (94% - 96%)    PE:   Comfortable, NAD, alert, oriented, pleasant, visiting with family          Dressing: clean/dry/intact aquacel          Sensation: intact to light touch to patient's baseline BLE          Motor exam:  firing ehl/ta/gs/fhl 5/5 BLE          Skin warm, well-perfused; capillary refill brisk BLE             LABS:                        8.2    5.51  )-----------( 137      ( 04 Nov 2021 08:28 )             24.9     11-04    136  |  105  |  25<H>  ----------------------------<  137<H>  4.1   |  25  |  1.80<H>    Ca    8.1<L>      04 Nov 2021 08:28  Mg     1.9     11-03        ASSESSMENT AND PLAN: POD 6  left hip hemiarthroplasty   1. Stable. Labs reviewed.   - AM labs ordered  - Appreciate speech and swallow eval, minced/moist diet ordered, ensure enlive ordered  - RUE doppler ordered for right arm pain and concern for infiltrated IV this afternoon. IV removed, pain improving.   2. Analgesic pain control  3. DVT prophylaxis: SCDs,  BID   4. Weight Bearing Status:  WBAT   5. Disposition: Refusing [facility], wants home with help, 2 person assist for bed mobility at this time- pt not able to accommodate 2 ppl at home until Friday , SW aware, attg aware, family aware to plan appropriately     POST OPERATIVE DAY # 6 left hip martina with Dr. Mayorga   SUBJECTIVE: Patient seen and examined.  Pt without complaints.   Denies chest pain/SOB/dizziness/n/v/HA   Pain well controlled.       OBJECTIVE:     Vital Signs Last 24 Hrs  T(C): 36.9 (04 Nov 2021 13:44), Max: 37.1 (03 Nov 2021 22:10)  T(F): 98.5 (04 Nov 2021 13:44), Max: 98.7 (03 Nov 2021 22:10)  HR: 87 (04 Nov 2021 13:44) (78 - 95)  BP: 120/56 (04 Nov 2021 13:44) (120/56 - 169/62)  BP(mean): --  RR: 17 (04 Nov 2021 13:44) (17 - 19)  SpO2: 96% (04 Nov 2021 13:44) (94% - 96%)    PE:   Comfortable, NAD, alert, oriented, pleasant, visiting with family          Dressing: clean/dry/intact aquacel. abduction pillow in place          Sensation: intact to light touch to patient's baseline BLE          Motor exam:  firing ehl/ta/gs/fhl 5/5 BLE          Skin warm, well-perfused; capillary refill brisk BLE             LABS:                        8.2    5.51  )-----------( 137      ( 04 Nov 2021 08:28 )             24.9     11-04    136  |  105  |  25<H>  ----------------------------<  137<H>  4.1   |  25  |  1.80<H>    Ca    8.1<L>      04 Nov 2021 08:28  Mg     1.9     11-03        ASSESSMENT AND PLAN: POD 6  left hip hemiarthroplasty   1. Stable. Labs reviewed.   - AM labs ordered  - Appreciate speech and swallow eval, minced/moist diet ordered, ensure enlive ordered  - RUE doppler ordered for right arm pain and concern for infiltrated IV this afternoon. IV removed, pain improving.   2. Analgesic pain control  3. DVT prophylaxis: SCDs,  BID   4. Weight Bearing Status:  WBAT   5. Disposition: Refusing [facility], wants home with help, 2 person assist for bed mobility at this time- pt not able to accommodate 2 ppl at home until Friday , SW aware, attg aware, family aware to plan appropriately

## 2021-11-04 NOTE — PROGRESS NOTE ADULT - ASSESSMENT
89M w h/o CKD (1.6), BPH, HTN, Gout, cholelithiasis w procedures at [facility] previously, p/w mechanical fall onto L side found to have L FNF - s/p L Hemiarthroplasty 10/29 w Dr. Mayorga, s/p 1u RBC on 10/30 and 10/31    #Post-op state. Pain controlled. On  BID for ppx. On bowel regimen and incentive spirometer  #HTN - Bp 120-140s. Improved. Possibly pain w PT. Home regimen as follows: Amlodipine 2.5 (improved to 5mg), losartan 100  #Blood loss anemia -Stable today 8.2 from 8.4. Appears asymptomatic. s/p 1u 10/31. Also 1u 10/30. Pt was 9.7 on baseline  #Thrombocytopenia - Uptrending 137 from 116. 135 at admission. Possible dilution effect. Continue to monitor  #hyponatremia - resolved.  #CKDIII - Cr stable 1.8 today. (1.6 baseline)  #HLD - c/w home statin  #BPH - on finasteride and flomax at home  #RUE swelling - as below    Recommendations  -RUE Doppler - suspect edema likely from infiltrated IV however extent of edema toward upper arm as well. Thus obtain to r/o VTE/Thrombophlebitis. Replace PIV  -F/U BP - suspect elevated SBP today 2/2 pain. If SBP remains persistently elevated can increase amlodipine to 10mg  -Add on iron studies, ferritin, reticulocyte    Encourage OOB to chair if able. Encourage incentive spirometer use  Titrate nasal canula to sat > 90%  F/U CBC w diff as outpatient regarding anemia  Caution w NSAIDs post op due to CKD    DISPO: [facility] vs HPT pending progress. At this time - pt is 2 person assist per PT. Family hoping to be able to take family home w HPT, Family assist. Family will have 2 family members available on FRI. Dual plan for [facility] as well and referrals sent by SW/CM - appreciate assistance. Anticipate DC 11/5

## 2021-11-05 LAB
ANION GAP SERPL CALC-SCNC: 9 MMOL/L — SIGNIFICANT CHANGE UP (ref 5–17)
BUN SERPL-MCNC: 25 MG/DL — HIGH (ref 7–23)
CALCIUM SERPL-MCNC: 8.3 MG/DL — LOW (ref 8.4–10.5)
CHLORIDE SERPL-SCNC: 104 MMOL/L — SIGNIFICANT CHANGE UP (ref 96–108)
CO2 SERPL-SCNC: 23 MMOL/L — SIGNIFICANT CHANGE UP (ref 22–31)
CREAT SERPL-MCNC: 1.66 MG/DL — HIGH (ref 0.5–1.3)
FERRITIN SERPL-MCNC: 604 NG/ML — HIGH (ref 30–400)
GLUCOSE SERPL-MCNC: 142 MG/DL — HIGH (ref 70–99)
HCT VFR BLD CALC: 26.4 % — LOW (ref 39–50)
HGB BLD-MCNC: 8.6 G/DL — LOW (ref 13–17)
IRON SATN MFR SERPL: 43 % — SIGNIFICANT CHANGE UP (ref 16–55)
IRON SATN MFR SERPL: 52 UG/DL — SIGNIFICANT CHANGE UP (ref 45–165)
MAGNESIUM SERPL-MCNC: 1.8 MG/DL — SIGNIFICANT CHANGE UP (ref 1.6–2.6)
MCHC RBC-ENTMCNC: 32.2 PG — SIGNIFICANT CHANGE UP (ref 27–34)
MCHC RBC-ENTMCNC: 32.6 GM/DL — SIGNIFICANT CHANGE UP (ref 32–36)
MCV RBC AUTO: 98.9 FL — SIGNIFICANT CHANGE UP (ref 80–100)
NRBC # BLD: 0 /100 WBCS — SIGNIFICANT CHANGE UP (ref 0–0)
PHOSPHATE SERPL-MCNC: 3.2 MG/DL — SIGNIFICANT CHANGE UP (ref 2.5–4.5)
PLATELET # BLD AUTO: 183 K/UL — SIGNIFICANT CHANGE UP (ref 150–400)
POTASSIUM SERPL-MCNC: 4.2 MMOL/L — SIGNIFICANT CHANGE UP (ref 3.5–5.3)
POTASSIUM SERPL-SCNC: 4.2 MMOL/L — SIGNIFICANT CHANGE UP (ref 3.5–5.3)
RBC # BLD: 2.67 M/UL — LOW (ref 4.2–5.8)
RBC # BLD: 2.67 M/UL — LOW (ref 4.2–5.8)
RBC # FLD: 14.9 % — HIGH (ref 10.3–14.5)
RETICS #: 91 K/UL — SIGNIFICANT CHANGE UP (ref 25–125)
RETICS/RBC NFR: 3.4 % — HIGH (ref 0.5–2.5)
SODIUM SERPL-SCNC: 136 MMOL/L — SIGNIFICANT CHANGE UP (ref 135–145)
TIBC SERPL-MCNC: 122 UG/DL — LOW (ref 220–430)
UIBC SERPL-MCNC: 70 UG/DL — LOW (ref 110–370)
WBC # BLD: 6.75 K/UL — SIGNIFICANT CHANGE UP (ref 3.8–10.5)
WBC # FLD AUTO: 6.75 K/UL — SIGNIFICANT CHANGE UP (ref 3.8–10.5)

## 2021-11-05 PROCEDURE — 99232 SBSQ HOSP IP/OBS MODERATE 35: CPT

## 2021-11-05 RX ORDER — FINASTERIDE 5 MG/1
1 TABLET, FILM COATED ORAL
Qty: 0 | Refills: 0 | DISCHARGE
Start: 2021-11-05

## 2021-11-05 RX ORDER — TAMSULOSIN HYDROCHLORIDE 0.4 MG/1
1 CAPSULE ORAL
Qty: 0 | Refills: 0 | DISCHARGE

## 2021-11-05 RX ORDER — ASPIRIN/CALCIUM CARB/MAGNESIUM 324 MG
1 TABLET ORAL
Qty: 0 | Refills: 0 | DISCHARGE
Start: 2021-11-05

## 2021-11-05 RX ORDER — OXYCODONE HYDROCHLORIDE 5 MG/1
1 TABLET ORAL
Qty: 0 | Refills: 0 | DISCHARGE
Start: 2021-11-05

## 2021-11-05 RX ORDER — FINASTERIDE 5 MG/1
1 TABLET, FILM COATED ORAL
Qty: 0 | Refills: 0 | DISCHARGE

## 2021-11-05 RX ORDER — AMLODIPINE BESYLATE 2.5 MG/1
1 TABLET ORAL
Qty: 0 | Refills: 0 | DISCHARGE

## 2021-11-05 RX ORDER — AMLODIPINE BESYLATE 2.5 MG/1
5 TABLET ORAL ONCE
Refills: 0 | Status: COMPLETED | OUTPATIENT
Start: 2021-11-05 | End: 2021-11-05

## 2021-11-05 RX ORDER — AMLODIPINE BESYLATE 2.5 MG/1
1 TABLET ORAL
Qty: 0 | Refills: 0 | DISCHARGE
Start: 2021-11-05

## 2021-11-05 RX ORDER — ATORVASTATIN CALCIUM 80 MG/1
1 TABLET, FILM COATED ORAL
Qty: 0 | Refills: 0 | DISCHARGE
Start: 2021-11-05

## 2021-11-05 RX ORDER — ATORVASTATIN CALCIUM 80 MG/1
1 TABLET, FILM COATED ORAL
Qty: 0 | Refills: 0 | DISCHARGE

## 2021-11-05 RX ORDER — ALLOPURINOL 300 MG
1 TABLET ORAL
Qty: 0 | Refills: 0 | DISCHARGE

## 2021-11-05 RX ORDER — ALLOPURINOL 300 MG
1 TABLET ORAL
Qty: 0 | Refills: 0 | DISCHARGE
Start: 2021-11-05

## 2021-11-05 RX ORDER — ASPIRIN/CALCIUM CARB/MAGNESIUM 324 MG
81 TABLET ORAL
Qty: 0 | Refills: 0 | DISCHARGE

## 2021-11-05 RX ORDER — ACETAMINOPHEN 500 MG
3 TABLET ORAL
Qty: 0 | Refills: 0 | DISCHARGE
Start: 2021-11-05

## 2021-11-05 RX ORDER — AMLODIPINE BESYLATE 2.5 MG/1
7.5 TABLET ORAL DAILY
Refills: 0 | Status: DISCONTINUED | OUTPATIENT
Start: 2021-11-05 | End: 2021-11-06

## 2021-11-05 RX ORDER — TAMSULOSIN HYDROCHLORIDE 0.4 MG/1
1 CAPSULE ORAL
Qty: 0 | Refills: 0 | DISCHARGE
Start: 2021-11-05

## 2021-11-05 RX ORDER — SENNA PLUS 8.6 MG/1
2 TABLET ORAL
Qty: 0 | Refills: 0 | DISCHARGE
Start: 2021-11-05

## 2021-11-05 RX ORDER — LOSARTAN POTASSIUM 100 MG/1
1 TABLET, FILM COATED ORAL
Qty: 0 | Refills: 0 | DISCHARGE
Start: 2021-11-05

## 2021-11-05 RX ADMIN — Medication 975 MILLIGRAM(S): at 21:36

## 2021-11-05 RX ADMIN — Medication 975 MILLIGRAM(S): at 13:02

## 2021-11-05 RX ADMIN — Medication 975 MILLIGRAM(S): at 07:10

## 2021-11-05 RX ADMIN — TAMSULOSIN HYDROCHLORIDE 0.4 MILLIGRAM(S): 0.4 CAPSULE ORAL at 21:36

## 2021-11-05 RX ADMIN — AMLODIPINE BESYLATE 5 MILLIGRAM(S): 2.5 TABLET ORAL at 06:48

## 2021-11-05 RX ADMIN — Medication 975 MILLIGRAM(S): at 14:00

## 2021-11-05 RX ADMIN — Medication 100 MILLIGRAM(S): at 11:51

## 2021-11-05 RX ADMIN — Medication 325 MILLIGRAM(S): at 18:25

## 2021-11-05 RX ADMIN — SENNA PLUS 2 TABLET(S): 8.6 TABLET ORAL at 21:36

## 2021-11-05 RX ADMIN — Medication 975 MILLIGRAM(S): at 06:48

## 2021-11-05 RX ADMIN — Medication 975 MILLIGRAM(S): at 22:35

## 2021-11-05 RX ADMIN — PANTOPRAZOLE SODIUM 40 MILLIGRAM(S): 20 TABLET, DELAYED RELEASE ORAL at 06:47

## 2021-11-05 RX ADMIN — FINASTERIDE 5 MILLIGRAM(S): 5 TABLET, FILM COATED ORAL at 11:51

## 2021-11-05 RX ADMIN — ATORVASTATIN CALCIUM 20 MILLIGRAM(S): 80 TABLET, FILM COATED ORAL at 21:36

## 2021-11-05 RX ADMIN — AMLODIPINE BESYLATE 5 MILLIGRAM(S): 2.5 TABLET ORAL at 19:31

## 2021-11-05 RX ADMIN — Medication 325 MILLIGRAM(S): at 06:47

## 2021-11-05 RX ADMIN — LOSARTAN POTASSIUM 100 MILLIGRAM(S): 100 TABLET, FILM COATED ORAL at 06:48

## 2021-11-05 NOTE — PROGRESS NOTE ADULT - ASSESSMENT
89M w h/o CKD (1.6), BPH, HTN, Gout, cholelithiasis w procedures at [facility] previously, p/w mechanical fall onto L side found to have L FNF - s/p L Hemiarthroplasty 10/29 w Dr. Mayorga, s/p 1u RBC on 10/30 and 10/31 - optimized for [facility]    #Post-op state. Pain controlled. On  BID for ppx. On bowel regimen and incentive spirometer  #HTN - Above target. 160-170s. Possibly pain w PT. Home regimen as follows: Amlodipine 2.5 (improved to 5mg), losartan 100  #Blood loss anemia -Stable today 8.6. Appears asymptomatic. s/p 1u 10/31. Also 1u 10/30. Pt was 9.7 on baseline  #Thrombocytopenia - This has resolved. 135 at admission. Possible dilution effect. Continue to monitor  #hyponatremia - resolved.  #CKDIII - Cr stable 1.8 today. (1.6 baseline)  #HLD - c/w home statin  #BPH - on finasteride and flomax at home  #RUE cephalic vein thrombus - seen on RUE doppler. likely 2/2 PIV. Clinically improved today    Recommendations  -from medical standpoint, pt is optimized for disposition  -Can increase amlodipine to 7.5mg daily on discharge.  Appreciate vascular surgery recs. Plan for outpatient follow-up  Encourage OOB to chair if able. Encourage incentive spirometer use  F/U CBC w diff as outpatient regarding anemia  Caution w NSAIDs post op due to CKD    DISPO: [facility] - pt has acceptances, no Auth needed. Family to tour SARs today. Made aware pt is medically optimized.

## 2021-11-05 NOTE — PROVIDER CONTACT NOTE (CRITICAL VALUE NOTIFICATION) - ACTION/TREATMENT ORDERED:
Team paged multiple times and SPOK message sent. Awaiting team reply. will continue to monitor.
Amlodipine 5 mg po was given as ordered.

## 2021-11-05 NOTE — PROVIDER CONTACT NOTE (CRITICAL VALUE NOTIFICATION) - ASSESSMENT
Patient asymptomatic.
Pt denies chest pain, dizziness or lightheadedness. denies SOB. pt asymptomatic. /61, HR 79.

## 2021-11-05 NOTE — PROGRESS NOTE ADULT - SUBJECTIVE AND OBJECTIVE BOX
POST OPERATIVE DAY # 7 left hip martina with Dr. Mayorga   SUBJECTIVE: Patient seen and examined.  Pt without complaints. UE doppler for infiltrated IV - small thrombus in cephalic vein at level of wrist. Vascular consulted, rec ace/elevation.  Denies chest pain/SOB/dizziness/n/v/HA   Pain well controlled.       OBJECTIVE:   Vital Signs Last 24 Hrs  T(C): 36.7 (05 Nov 2021 01:07), Max: 36.9 (04 Nov 2021 13:44)  T(F): 98 (05 Nov 2021 01:07), Max: 98.5 (04 Nov 2021 13:44)  HR: 74 (05 Nov 2021 01:07) (74 - 87)  BP: 161/60 (05 Nov 2021 01:07) (120/56 - 175/62)  BP(mean): --  RR: 18 (05 Nov 2021 01:07) (16 - 18)  SpO2: 94% (05 Nov 2021 01:07) (94% - 96%)      PE:   Comfortable, NAD, alert, oriented, pleasant, visiting with family          Dressing: clean/dry/intact aquacel. abduction pillow in place          Sensation: intact to light touch to patient's baseline BLE          Motor exam:  firing ehl/ta/gs/fhl 5/5 BLE          Skin warm, well-perfused; capillary refill brisk BLE             LABS:                                   8.2    5.51  )-----------( 137      ( 04 Nov 2021 08:28 )             24.9       ASSESSMENT AND PLAN: POD 6  left hip hemiarthroplasty   1. Stable. Labs reviewed.   - AM labs ordered  - Appreciate speech and swallow eval, minced/moist diet ordered, ensure enlive ordered  - RUE doppler ordered for right arm pain and concern for infiltrated IV this afternoon. IV removed, pain improving.   2. Analgesic pain control  3. DVT prophylaxis: SCDs,  BID   4. Weight Bearing Status:  WBAT   5. Disposition: Refusing [facility], wants home with help, 2 person assist for bed mobility at this time- pt not able to accommodate 2 ppl at home until Friday , SW aware, attg aware, family aware to plan appropriately

## 2021-11-05 NOTE — PROGRESS NOTE ADULT - SUBJECTIVE AND OBJECTIVE BOX
INTERVAL HPI/OVERNIGHT EVENTS: ANA O/N    SUBJECTIVE: Patient seen and examined at bedside.   Pt reports pain improved in R forearm. No numbness. No pain in L hip. Denies any fever, chest pain, dyspnea. Eating well wo nausea, abd pain. Passing BM and voiding.  Discussed w family - plan is for patient to go for a brief time to rehab and then plan to continue care at home.       OBJECTIVE:    VITAL SIGNS:  ICU Vital Signs Last 24 Hrs  T(C): 36.9 (05 Nov 2021 13:47), Max: 36.9 (04 Nov 2021 22:15)  T(F): 98.5 (05 Nov 2021 13:47), Max: 98.5 (05 Nov 2021 13:47)  HR: 72 (05 Nov 2021 13:47) (72 - 81)  BP: 165/72 (05 Nov 2021 13:47) (161/60 - 175/62)  BP(mean): --  ABP: --  ABP(mean): --  RR: 15 (05 Nov 2021 13:47) (15 - 18)  SpO2: 95% (05 Nov 2021 13:47) (94% - 95%)        11-04 @ 07:01  -  11-05 @ 07:00  --------------------------------------------------------  IN: 120 mL / OUT: 1065 mL / NET: -945 mL    11-05 @ 07:01 - 11-05 @ 17:07  --------------------------------------------------------  IN: 0 mL / OUT: 100 mL / NET: -100 mL      CAPILLARY BLOOD GLUCOSE          PHYSICAL EXAM:  GEN: Male in NAD on RA  HEENT: NC/AT, MMM  CV: RRR, nml S1S2, no murmurs  PULM: nml effort, CTAB wo rales, rhonchi, or wheezing  ABD: Soft, non-distended, NABS, non-tender  NEURO  A/O x3, moving all extremities, Sensation intact  4/5 in L hip and knee limited 2/2 pain. L thigh dressing c/d/i. Does not appear to have hematoma.  R forearm and distal arm in ACE bandage.   PSYCH: Appropriate      MEDICATIONS:  MEDICATIONS  (STANDING):  acetaminophen     Tablet .. 975 milliGRAM(s) Oral every 8 hours  allopurinol 100 milliGRAM(s) Oral daily  amLODIPine   Tablet 5 milliGRAM(s) Oral daily  aspirin 325 milliGRAM(s) Oral two times a day  atorvastatin 20 milliGRAM(s) Oral at bedtime  finasteride 5 milliGRAM(s) Oral daily  losartan 100 milliGRAM(s) Oral daily  pantoprazole    Tablet 40 milliGRAM(s) Oral before breakfast  senna 2 Tablet(s) Oral at bedtime  tamsulosin 0.4 milliGRAM(s) Oral at bedtime    MEDICATIONS  (PRN):  magnesium hydroxide Suspension 30 milliLiter(s) Oral daily PRN Constipation  melatonin 3 milliGRAM(s) Oral at bedtime PRN Insomnia  oxyCODONE    IR 5 milliGRAM(s) Oral every 4 hours PRN Moderate Pain (4 - 6)      ALLERGIES:  Allergies    No Known Allergies    Intolerances        LABS:                        8.6    6.75  )-----------( 183      ( 05 Nov 2021 07:34 )             26.4     11-05    136  |  104  |  25<H>  ----------------------------<  142<H>  4.2   |  23  |  1.66<H>    Ca    8.3<L>      05 Nov 2021 07:34  Phos  3.2     11-05  Mg     1.8     11-05            RADIOLOGY & ADDITIONAL TESTS: Reviewed.

## 2021-11-05 NOTE — PROGRESS NOTE ADULT - SUBJECTIVE AND OBJECTIVE BOX
SUBJECTIVE: No specific complaints this AM. Denies pain/sensory loss in the hand      MEDICATIONS  (STANDING):  acetaminophen     Tablet .. 975 milliGRAM(s) Oral every 8 hours  allopurinol 100 milliGRAM(s) Oral daily  amLODIPine   Tablet 5 milliGRAM(s) Oral daily  aspirin 325 milliGRAM(s) Oral two times a day  atorvastatin 20 milliGRAM(s) Oral at bedtime  finasteride 5 milliGRAM(s) Oral daily  losartan 100 milliGRAM(s) Oral daily  pantoprazole    Tablet 40 milliGRAM(s) Oral before breakfast  senna 2 Tablet(s) Oral at bedtime  tamsulosin 0.4 milliGRAM(s) Oral at bedtime    MEDICATIONS  (PRN):  magnesium hydroxide Suspension 30 milliLiter(s) Oral daily PRN Constipation  melatonin 3 milliGRAM(s) Oral at bedtime PRN Insomnia  oxyCODONE    IR 5 milliGRAM(s) Oral every 4 hours PRN Moderate Pain (4 - 6)      Vital Signs Last 24 Hrs  T(C): 36.7 (05 Nov 2021 06:40), Max: 36.9 (04 Nov 2021 13:44)  T(F): 98 (05 Nov 2021 06:40), Max: 98.5 (04 Nov 2021 13:44)  HR: 79 (05 Nov 2021 06:40) (74 - 87)  BP: 169/61 (05 Nov 2021 06:40) (120/56 - 175/62)  BP(mean): --  RR: 18 (05 Nov 2021 06:40) (16 - 18)  SpO2: 95% (05 Nov 2021 06:40) (94% - 96%)    Physical Exam:  General: NAD, resting comfortably in bed  Pulmonary: Nonlabored breathing, no respiratory distress  Cardiovascular: NSR  Abdominal: soft, NT/ND  Extremities: WWP, normal strength  Neuro: A/O x 3, CNs II-XII grossly intact, no focal deficits, normal motor/sensation  Pulses: palpable distal pulses    I&O's Summary    04 Nov 2021 07:01  -  05 Nov 2021 07:00  --------------------------------------------------------  IN: 120 mL / OUT: 1065 mL / NET: -945 mL        LABS:                        8.2    5.51  )-----------( 137      ( 04 Nov 2021 08:28 )             24.9     11-04    136  |  105  |  25<H>  ----------------------------<  137<H>  4.1   |  25  |  1.80<H>    Ca    8.1<L>      04 Nov 2021 08:28  Mg     1.9     11-03          CAPILLARY BLOOD GLUCOSE            RADIOLOGY & ADDITIONAL STUDIES:       SUBJECTIVE: No specific complaints this AM. Denies pain/sensory loss in the hand      MEDICATIONS  (STANDING):  acetaminophen     Tablet .. 975 milliGRAM(s) Oral every 8 hours  allopurinol 100 milliGRAM(s) Oral daily  amLODIPine   Tablet 5 milliGRAM(s) Oral daily  aspirin 325 milliGRAM(s) Oral two times a day  atorvastatin 20 milliGRAM(s) Oral at bedtime  finasteride 5 milliGRAM(s) Oral daily  losartan 100 milliGRAM(s) Oral daily  pantoprazole    Tablet 40 milliGRAM(s) Oral before breakfast  senna 2 Tablet(s) Oral at bedtime  tamsulosin 0.4 milliGRAM(s) Oral at bedtime    MEDICATIONS  (PRN):  magnesium hydroxide Suspension 30 milliLiter(s) Oral daily PRN Constipation  melatonin 3 milliGRAM(s) Oral at bedtime PRN Insomnia  oxyCODONE    IR 5 milliGRAM(s) Oral every 4 hours PRN Moderate Pain (4 - 6)      Vital Signs Last 24 Hrs  T(C): 36.7 (05 Nov 2021 06:40), Max: 36.9 (04 Nov 2021 13:44)  T(F): 98 (05 Nov 2021 06:40), Max: 98.5 (04 Nov 2021 13:44)  HR: 79 (05 Nov 2021 06:40) (74 - 87)  BP: 169/61 (05 Nov 2021 06:40) (120/56 - 175/62)  BP(mean): --  RR: 18 (05 Nov 2021 06:40) (16 - 18)  SpO2: 95% (05 Nov 2021 06:40) (94% - 96%)    Physical Exam:  General: NAD, resting comfortably in bed  Pulmonary: Nonlabored breathing, no respiratory distress  Cardiovascular: NSR  Abdominal: soft, NT/ND  Extremities: WWP, normal strength, mild RUE swelling, ACE in place  Neuro: A/O x 3, CNs II-XII grossly intact, no focal deficits, normal motor/sensation  Pulses: palpable distal pulses    I&O's Summary    04 Nov 2021 07:01  -  05 Nov 2021 07:00  --------------------------------------------------------  IN: 120 mL / OUT: 1065 mL / NET: -945 mL        LABS:                        8.2    5.51  )-----------( 137      ( 04 Nov 2021 08:28 )             24.9     11-04    136  |  105  |  25<H>  ----------------------------<  137<H>  4.1   |  25  |  1.80<H>    Ca    8.1<L>      04 Nov 2021 08:28  Mg     1.9     11-03          CAPILLARY BLOOD GLUCOSE            RADIOLOGY & ADDITIONAL STUDIES:

## 2021-11-05 NOTE — PROGRESS NOTE ADULT - ASSESSMENT
89y.o M s/p L Hemiarthroplasty after L femoral neck Fx POD7 with R cephalic vein thrombus of wrist.    -No Acute Vascular Intervention  -Recommend Ace wrap of forearm  - Elevation and NSAIDs   - If increasing pain, tightness or loss of motor/sensory function, please reconsult    Please ensure patient follows up with Dr. Johnson 1 week after discharge 89y.o M s/p L Hemiarthroplasty after L femoral neck Fx POD7 with R cephalic vein thrombus of wrist.    - No Acute Vascular Intervention  - Ace bandage applied to RIGHT arm  - Recommend elevation and NSAIDs   - If increasing pain, tightness or loss of motor/sensory function, please reconsult, Vascular to Sign off at this time    Please ensure patient follows up with Dr. Johnson 1 week after discharge, call his office to schedule an appt: [phone]

## 2021-11-05 NOTE — PROGRESS NOTE ADULT - SUBJECTIVE AND OBJECTIVE BOX
Ortho Note      Subjective:  Pt comfortable without complaints, pain controlled with current pain medication regimen. Interpretation done by daughter at bedside. Daughter reports that patients grandson should be flying into [facility] to help assist the family with the dispo decision. . Daughter continues to express desire to take father home, plan is split between home with home pt and [facility].  Denies CP, SOB, N/V, numbness/tingling       Vital Signs Last 24 Hrs  T(C): 36.6 (11-05-21 @ 08:40), Max: 36.6 (11-05-21 @ 08:40)  T(F): 97.9 (11-05-21 @ 08:40), Max: 97.9 (11-05-21 @ 08:40)  HR: 77 (11-05-21 @ 08:40) (77 - 77)  BP: 164/67 (11-05-21 @ 08:40) (164/67 - 164/67)  BP(mean): --  RR: 16 (11-05-21 @ 08:40) (16 - 16)  SpO2: 95% (11-05-21 @ 08:40) (95% - 95%)  AVSS    Objective:    Physical Exam:  General: Pt Alert and oriented, NAD  Left hip aquacel DSG C/D/I  Pulses: +2 pedal pulses, wwp toes, cap refill less than 3 seconds  Sensation: silt intact bilateral lower extremities   Motor: EHL/FHL/TA/GS- firing        Plan of Care:  A/P: 89yMale POD#8 s/p Left hip Camacho posterior approach   - afebrile, nontoxic appearance  - Pain Control- tylenol 975mg PO Q8h, oxycodone 5mg Po Q4h prn moderate pain, Dilaudid 0.5mg Q4h prn breakthrough pain   - DVT ppx: aspirin 325mg PO BID  - PT, WBS: WBAT   bowel regimen, IS use, PPI  - appreciate Cardiology recs  - appreciate medicine recs  - CKD- continue to trend creatinine and BUN-   - Dispo- home with home pt pending pt clearance, vs [facility] for auth and acceptance    Ortho Pager [phone]

## 2021-11-06 ENCOUNTER — TRANSCRIPTION ENCOUNTER (OUTPATIENT)
Age: 86
End: 2021-11-06

## 2021-11-06 VITALS
RESPIRATION RATE: 18 BRPM | SYSTOLIC BLOOD PRESSURE: 165 MMHG | OXYGEN SATURATION: 96 % | HEART RATE: 79 BPM | DIASTOLIC BLOOD PRESSURE: 68 MMHG

## 2021-11-06 LAB
ANION GAP SERPL CALC-SCNC: 9 MMOL/L — SIGNIFICANT CHANGE UP (ref 5–17)
BUN SERPL-MCNC: 27 MG/DL — HIGH (ref 7–23)
CALCIUM SERPL-MCNC: 8.4 MG/DL — SIGNIFICANT CHANGE UP (ref 8.4–10.5)
CHLORIDE SERPL-SCNC: 106 MMOL/L — SIGNIFICANT CHANGE UP (ref 96–108)
CO2 SERPL-SCNC: 25 MMOL/L — SIGNIFICANT CHANGE UP (ref 22–31)
CREAT SERPL-MCNC: 1.74 MG/DL — HIGH (ref 0.5–1.3)
GLUCOSE SERPL-MCNC: 141 MG/DL — HIGH (ref 70–99)
HCT VFR BLD CALC: 26.5 % — LOW (ref 39–50)
HGB BLD-MCNC: 8.5 G/DL — LOW (ref 13–17)
MCHC RBC-ENTMCNC: 31.6 PG — SIGNIFICANT CHANGE UP (ref 27–34)
MCHC RBC-ENTMCNC: 32.1 GM/DL — SIGNIFICANT CHANGE UP (ref 32–36)
MCV RBC AUTO: 98.5 FL — SIGNIFICANT CHANGE UP (ref 80–100)
NRBC # BLD: 0 /100 WBCS — SIGNIFICANT CHANGE UP (ref 0–0)
PLATELET # BLD AUTO: 215 K/UL — SIGNIFICANT CHANGE UP (ref 150–400)
POTASSIUM SERPL-MCNC: 4.4 MMOL/L — SIGNIFICANT CHANGE UP (ref 3.5–5.3)
POTASSIUM SERPL-SCNC: 4.4 MMOL/L — SIGNIFICANT CHANGE UP (ref 3.5–5.3)
RBC # BLD: 2.69 M/UL — LOW (ref 4.2–5.8)
RBC # FLD: 14.8 % — HIGH (ref 10.3–14.5)
SODIUM SERPL-SCNC: 140 MMOL/L — SIGNIFICANT CHANGE UP (ref 135–145)
WBC # BLD: 5.25 K/UL — SIGNIFICANT CHANGE UP (ref 3.8–10.5)
WBC # FLD AUTO: 5.25 K/UL — SIGNIFICANT CHANGE UP (ref 3.8–10.5)

## 2021-11-06 PROCEDURE — 99232 SBSQ HOSP IP/OBS MODERATE 35: CPT

## 2021-11-06 RX ADMIN — Medication 975 MILLIGRAM(S): at 14:58

## 2021-11-06 RX ADMIN — Medication 325 MILLIGRAM(S): at 05:14

## 2021-11-06 RX ADMIN — FINASTERIDE 5 MILLIGRAM(S): 5 TABLET, FILM COATED ORAL at 12:04

## 2021-11-06 RX ADMIN — Medication 100 MILLIGRAM(S): at 12:04

## 2021-11-06 RX ADMIN — LOSARTAN POTASSIUM 100 MILLIGRAM(S): 100 TABLET, FILM COATED ORAL at 05:14

## 2021-11-06 RX ADMIN — Medication 975 MILLIGRAM(S): at 06:20

## 2021-11-06 RX ADMIN — AMLODIPINE BESYLATE 7.5 MILLIGRAM(S): 2.5 TABLET ORAL at 09:41

## 2021-11-06 RX ADMIN — Medication 975 MILLIGRAM(S): at 15:58

## 2021-11-06 RX ADMIN — Medication 975 MILLIGRAM(S): at 05:15

## 2021-11-06 RX ADMIN — PANTOPRAZOLE SODIUM 40 MILLIGRAM(S): 20 TABLET, DELAYED RELEASE ORAL at 07:10

## 2021-11-06 NOTE — PROGRESS NOTE ADULT - ASSESSMENT
89M w h/o CKD (1.6), BPH, HTN, Gout, cholelithiasis w procedures at [facility] previously, p/w mechanical fall onto L side found to have L FNF - s/p L Hemiarthroplasty 10/29 w Dr. Mayorga, s/p 1u RBC on 10/30 and 10/31 - optimized for [facility]    #Post-op state. Pain controlled. On  BID for ppx. On bowel regimen and incentive spirometer  #HTN - Above target. 160-170s. Possibly pain w PT. C/w Amlodipine 7.5mg qd, losartan 100mg qd; expect norvasc to take effect in one week  #Blood loss anemia -Stable today 8.5. Appears asymptomatic. s/p 1u 10/31. Also 1u 10/30. Pt was 9.7 on baseline  #Thrombocytopenia - resolved.   #hyponatremia - resolved.  #CKDIII - Cr stable 1.74 today. (1.6 baseline)  #HLD - c/w home statin  #BPH - on finasteride and flomax at home  #RUE cephalic vein thrombus - seen on RUE doppler. Likely 2/2 PIV. Appreciate vascular surgery recs. Plan for outpatient follow-up    DISPO: [facility] - pt has acceptances, no Auth needed. Family to tour SARs today. Made aware pt is medically optimized. For d/c today

## 2021-11-06 NOTE — PROGRESS NOTE ADULT - SUBJECTIVE AND OBJECTIVE BOX
INTERVAL EVENTS: No o/n events. Denies CP, dyspnea, palpitations, presyncope, syncope, f/c/n/v.     REVIEW OF SYSTEMS:  Constitutional:     [X] negative [ ] fevers [ ] chills [ ] weight loss [ ] weight gain  HEENT:                  [X] negative [ ] dry eyes [ ] eye irritation [ ] postnasal drip [ ] nasal congestion  CV:                         [X] negative  [ ] chest pain [ ] orthopnea [ ] palpitations [ ] murmur  Resp:                     [X] negative [ ] cough [ ] shortness of breath [ ] wheezing [ ] sputum [ ] hemoptysis  GI:                          [X] negative [ ] nausea [ ] vomiting [ ] diarrhea [ ] constipation [ ] abd pain [ ] dysphagia   :                        [X] negative [ ] dysuria [ ] nocturia [ ] hematuria [ ] increased urinary frequency  MSK:                      [ ] negative [ ] back pain [X] myalgias [ ] arthralgias [ ] fracture  Skin:                       [X] negative [ ] rash [ ] itch  Neuro:                   [X] negative [ ] headache [ ] dizziness [ ] syncope [ ] weakness [ ] numbness  Psych:                    [X] negative [ ] anxiety [ ] depression  Endo:                     [X] negative [ ] diabetes [ ] thyroid problem  Heme/Lymph:      [X] negative [ ] anemia [ ] bleeding problem  Allergic/Immune: [X] negative [ ] itchy eyes [ ] nasal discharge [ ] hives [ ] angioedema    [X] All other systems negative or otherwise described above.  [ ] Unable to assess ROS because ________.    PAST MEDICAL & SURGICAL HISTORY:  HTN (hypertension)    Chronic kidney disease, unspecified CKD stage      MEDICATIONS  (STANDING):  acetaminophen     Tablet .. 975 milliGRAM(s) Oral every 8 hours  allopurinol 100 milliGRAM(s) Oral daily  amLODIPine   Tablet 7.5 milliGRAM(s) Oral daily  aspirin 325 milliGRAM(s) Oral two times a day  atorvastatin 20 milliGRAM(s) Oral at bedtime  finasteride 5 milliGRAM(s) Oral daily  losartan 100 milliGRAM(s) Oral daily  pantoprazole    Tablet 40 milliGRAM(s) Oral before breakfast  senna 2 Tablet(s) Oral at bedtime  tamsulosin 0.4 milliGRAM(s) Oral at bedtime    MEDICATIONS  (PRN):  magnesium hydroxide Suspension 30 milliLiter(s) Oral daily PRN Constipation  melatonin 3 milliGRAM(s) Oral at bedtime PRN Insomnia    ICU Vital Signs Last 24 Hrs  T(C): 36.8 (06 Nov 2021 12:33), Max: 37 (05 Nov 2021 20:40)  T(F): 98.2 (06 Nov 2021 12:33), Max: 98.6 (05 Nov 2021 20:40)  HR: 79 (06 Nov 2021 14:59) (73 - 90)  BP: 165/68 (06 Nov 2021 14:59) (150/60 - 178/60)  BP(mean): --  ABP: --  ABP(mean): --  RR: 18 (06 Nov 2021 14:59) (16 - 18)  SpO2: 96% (06 Nov 2021 14:59) (94% - 96%)    Orthostatic VS    Daily     Daily   I&O's Summary    05 Nov 2021 07:01  -  06 Nov 2021 07:00  --------------------------------------------------------  IN: 0 mL / OUT: 1350 mL / NET: -1350 mL    06 Nov 2021 08:01  -  06 Nov 2021 19:41  --------------------------------------------------------  IN: 0 mL / OUT: 525 mL / NET: -525 mL        PHYSICAL EXAM:  GEN: Male in NAD on RA  HEENT: NC/AT, MMM  CV: RRR, nml S1S2, decrescendo/crescendo murmur 3/6 radiating to carotids  PULM: nml effort, CTAB wo rales, rhonchi, or wheezing  ABD: Soft, non-distended, NABS, non-tender  NEURO  A/O x3, moving all extremities, Sensation intact  4/5 in L hip and knee limited 2/2 pain. L thigh dressing c/d/i. Does not appear to have hematoma.  R forearm and distal arm in ACE bandage.   PSYCH: Appropriate    LABS:                        8.5    5.25  )-----------( 215      ( 06 Nov 2021 06:33 )             26.5       11-06    140  |  106  |  27<H>  ----------------------------<  141<H>  4.4   |  25  |  1.74<H>    Ca    8.4      06 Nov 2021 06:33  Phos  3.2     11-05  Mg     1.8     11-05        TSH: Thyroid Stimulating Hormone, Serum: 1.610 uIU/mL (10-29-21 @ 02:11)      RADIOLOGY & ADDITIONAL STUDIES:    ECG: Personally reviewed  10/29: NSR, RBBB    Echo: Personally reviewed  < from: TTE Echo Complete w/o Contrast w/ Doppler (10.29.21 @ 14:38) >  --------------------------------------------------------------------------------  CONCLUSIONS:     1. Mild symmetric left ventricular hypertrophy.   2. Normal left ventricular size and systolic function.   3. Grade II left ventricular diastolic dysfunction.   4. Normal right ventricular size and systolic function.   5. Mildly dilated left atrium.   6.Mild aortic stenosis. Peak transvalvular velocity is 2.90 m/s, the mean transvalvular gradient is 20.00 mmHg, and the LVOT/AV velocity ratio is 0.55. Peak transaortic gradient is 33.64 mmHg. Aortic valve area (estimated via the continuity method) is 1.66 cm².   7. Mild aortic regurgitation.   8. Mild mitral stenosis.   9. Mild tricuspid regurgitation.  10. Pulmonary hypertension present, pulmonary artery systolic pressure is 56 mmHg.  11. No pericardial effusion.  12. Aortic root is mildly dilated measuring 3.80 cm. 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  13. No prior echo is available for comparison.    --------------------------------------------------------------------------------    < end of copied text >      CXR: Personally reviewed  < from: Xray Chest 1 View- PORTABLE-Urgent (Xray Chest 1 View- PORTABLE-Urgent .) (10.29.21 @ 00:38) >  Findings/  impression: Cardiomegaly, thoracic aortic calcification. Lungs and mediastinum are unremarkable. Thoracic spine and bilateral AC joint degenerative changes. Dextroscoliosis. Pneumobilia.    --- End of Report ---    < end of copied text >    Other misc imaging:   < from: Xray Pelvis 2 views (10.29.21 @ 23:06) >  IMPRESSION: Status post left hip replacement      < end of copied text >    < from: Xray Femur 2 Views, Left (10.28.21 @ 18:52) >  Findings/  impression: Left femoral neck fracture/varus deformity Left hip, pubic symphysis and knee degenerative changes. Pelvic and femoral vascular calcification    --- End of Report ---    < end of copied text >    < from: Xray Hip w/ Pelvis 2 or 3 Views, Left (10.28.21 @ 18:50) >  Findings/  impression: Left femoral neck fracture/varus deformity. Bilateral hip, lower lumbar spine and pubic symphysis degenerative changes. Bilateral pelvic and femoral vascular calcification.    --- End of Report ---    < end of copied text >    < from: US Duplex Venous Upper Ext Ltd, Right (11.04.21 @ 18:45) >  IMPRESSION:  Thrombus seen in the right cephalic vein at the level of the wrist.    Findings were discussed with Dr. Baker at 8:36pm on 11/4/21.    --- End of Report ---    < end of copied text >

## 2021-11-06 NOTE — PROGRESS NOTE ADULT - ATTENDING COMMENTS
Pt. seen/ examined  Labs/ vitals reviewed  Awaiting discharge dispo
Pt. seen/ examined  Labs/ vitals reviewed  Discussed follow up with family  Dressing changed to Aquacel  RUE DVT: will follow with vascular  Planning D/C to rehab today  Continue DVT prx, WBAT, posterior hip precautions
Pt. seen/ examined  Labs/ vitals reviewed  Getting 1 Unit PRBCs  Walked with PT  Pain well-controlled  Prevena functional  Will follow dispo planning
Pt. seen/ examined  Labs/ vitals reviewed  Receiving 1 unit PRBCs  Pain well-controlled  Prevena functional  Will follow dispo planning - recommend home w/ services
Initial attending contact date  10/29/21    . See fellow note written above for details. I reviewed the fellow documentation. I have personally seen and examined this patient. I reviewed vitals, labs, medications, cardiac studies, and additional imaging. I agree with the above fellow's findings and plans as written above with the following additions/statements.     89M w/HTN, CKD, BPH, gout, possible TIA per the family adm to ortho on 10/29 for mechanical fall w/L hip fracture. Cardiology consulted for preop assessment.    -pt s/p OR, without active complaints  -EKG NSR without ischemic changes  -On exam with 3/6 JAQUELINE suggestive of moderate AS  -ECHO with normal LVEF, mild-mod AS  -No need for further work up at this time  -Pls reconsult as needed

## 2021-11-06 NOTE — PROGRESS NOTE ADULT - SUBJECTIVE AND OBJECTIVE BOX
POST OPERATIVE DAY # 8 left hip martina with Dr. Mayorga   SUBJECTIVE: Patient seen and examined.  Pt without complaints. For rehab today.  Denies chest pain/SOB/dizziness/n/v/HA   Pain well controlled.       OBJECTIVE:   Vital Signs Last 24 Hrs  T(C): 36.7 (06 Nov 2021 08:59), Max: 37 (05 Nov 2021 20:40)  T(F): 98 (06 Nov 2021 08:59), Max: 98.6 (05 Nov 2021 20:40)  HR: 73 (06 Nov 2021 08:59) (72 - 85)  BP: 178/60 (06 Nov 2021 08:59) (150/60 - 189/79)  BP(mean): --  RR: 18 (06 Nov 2021 08:59) (15 - 18)  SpO2: 95% (06 Nov 2021 08:59) (94% - 96%)      PE:   Comfortable, NAD, alert, oriented, pleasant, visiting with family          Dressing: clean/dry/intact aquacel. abduction pillow in place          Sensation: intact to light touch to patient's baseline BLE          Motor exam:  firing ehl/ta/gs/fhl 5/5 BLE          Skin warm, well-perfused; capillary refill brisk BLE             LABS:                                              8.5    5.25  )-----------( 215      ( 06 Nov 2021 06:33 )             26.5       ASSESSMENT AND PLAN: 89M s/p left hip hemiarthroplasty   1. Stable. Labs reviewed.   - AM labs ordered  - Appreciate speech and swallow eval, minced/moist diet ordered, ensure enlive ordered  2. Analgesic pain control  3. DVT prophylaxis: SCDs,  BID   4. Weight Bearing Status:  WBAT   5. Disposition: [facility]

## 2021-11-06 NOTE — PROGRESS NOTE ADULT - PROVIDER SPECIALTY LIST ADULT
Cardiology
Orthopedics
Hospitalist
Orthopedics
Vascular Surgery
Hospitalist
Orthopedics
Hospitalist

## 2021-11-09 PROBLEM — I10 ESSENTIAL (PRIMARY) HYPERTENSION: Chronic | Status: ACTIVE | Noted: 2021-10-28

## 2021-11-09 PROBLEM — N18.9 CHRONIC KIDNEY DISEASE, UNSPECIFIED: Chronic | Status: ACTIVE | Noted: 2021-10-29

## 2021-11-09 LAB — SURGICAL PATHOLOGY STUDY: SIGNIFICANT CHANGE UP

## 2021-11-10 PROBLEM — Z00.00 ENCOUNTER FOR PREVENTIVE HEALTH EXAMINATION: Status: ACTIVE | Noted: 2021-11-10

## 2021-11-12 DIAGNOSIS — M10.9 GOUT, UNSPECIFIED: ICD-10-CM
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
## 2021-11-12 DIAGNOSIS — D53.9 NUTRITIONAL ANEMIA, UNSPECIFIED: ICD-10-CM

## 2021-11-12 DIAGNOSIS — R01.1 CARDIAC MURMUR, UNSPECIFIED: ICD-10-CM

## 2021-11-12 DIAGNOSIS — K80.20 CALCULUS OF GALLBLADDER WITHOUT CHOLECYSTITIS WITHOUT OBSTRUCTION: ICD-10-CM

## 2021-11-12 DIAGNOSIS — E87.1 HYPO-OSMOLALITY AND HYPONATREMIA: ICD-10-CM

## 2021-11-12 DIAGNOSIS — I12.9 HYPERTENSIVE CHRONIC KIDNEY DISEASE WITH STAGE 1 THROUGH STAGE 4 CHRONIC KIDNEY DISEASE, OR UNSPECIFIED CHRONIC KIDNEY DISEASE: ICD-10-CM

## 2021-11-12 DIAGNOSIS — N40.0 BENIGN PROSTATIC HYPERPLASIA WITHOUT LOWER URINARY TRACT SYMPTOMS: ICD-10-CM

## 2021-11-12 DIAGNOSIS — D62 ACUTE POSTHEMORRHAGIC ANEMIA: ICD-10-CM

## 2021-11-12 DIAGNOSIS — E87.2 ACIDOSIS: ICD-10-CM

## 2021-11-12 DIAGNOSIS — G47.00 INSOMNIA, UNSPECIFIED: ICD-10-CM

## 2021-11-12 DIAGNOSIS — D69.6 THROMBOCYTOPENIA, UNSPECIFIED: ICD-10-CM

## 2021-11-12 DIAGNOSIS — S72.002A FRACTURE OF UNSPECIFIED PART OF NECK OF LEFT FEMUR, INITIAL ENCOUNTER FOR CLOSED FRACTURE: ICD-10-CM
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
## 2021-11-12 DIAGNOSIS — I82.611 ACUTE EMBOLISM AND THROMBOSIS OF SUPERFICIAL VEINS OF RIGHT UPPER EXTREMITY: ICD-10-CM

## 2021-11-12 DIAGNOSIS — E78.5 HYPERLIPIDEMIA, UNSPECIFIED: ICD-10-CM

## 2021-11-12 DIAGNOSIS — N18.30 CHRONIC KIDNEY DISEASE, STAGE 3 UNSPECIFIED: ICD-10-CM

## 2021-11-12 DIAGNOSIS — K59.00 CONSTIPATION, UNSPECIFIED: ICD-10-CM

## 2021-11-16 ENCOUNTER — APPOINTMENT (OUTPATIENT)
Dept: VASCULAR SURGERY | Facility: CLINIC | Age: 86
End: 2021-11-16
Payer: MEDICARE

## 2021-11-16 DIAGNOSIS — I80.9 PHLEBITIS AND THROMBOPHLEBITIS OF UNSPECIFIED SITE: ICD-10-CM

## 2021-11-16 PROCEDURE — 99213 OFFICE O/P EST LOW 20 MIN: CPT

## 2021-11-16 PROCEDURE — 93971 EXTREMITY STUDY: CPT

## 2021-11-17 PROBLEM — I80.9 SUPERFICIAL PHLEBITIS: Status: ACTIVE | Noted: 2021-11-17

## 2021-11-18 NOTE — END OF VISIT
[FreeTextEntry3] : I, Dr. Jeff Johnson  have read and attest that all the information, medical decision making and discharge instructions within are true and accurate. I personally performed the evaluation and management (E/M) services for this new patient.  That E/M includes conducting the initial examination, assessing all conditions, and establishing the plan of care.  Today, my ACP, Hannah Yu PA-C, was here to observe my evaluation and management services for this patient to be followed going forward.\par   [Time Spent: ___ minutes] : I have spent [unfilled] minutes of time on the encounter.

## 2021-11-18 NOTE — ASSESSMENT
[FreeTextEntry1] : 89 year old male with for left hip surgery on 10/29/21 and during hospital stay he was found to have cephalic vein SVT on the right arm from IV placement and was started on aspirin. He is here for FU. He denies right arm pain or right arm swelling. ACE bandage was on the arm earlier in the week but removed when hand started to swell and swelling improved. \par \par Plan:\par - US showing resolved SVT in the cephalic vein, he does not need further treatment for the SVT\par - FU here as needed.

## 2021-11-18 NOTE — HISTORY OF PRESENT ILLNESS
[FreeTextEntry1] : 89 year old male with for left hip surgery on 10/29/21 and during hospital stay he was found to have cephalic vein SVT on the right arm from IV placement and was started on aspirin. He is here for FU. He denies right arm pain or right arm swelling. ACE bandage was on the arm earlier in the week but removed when hand started to swell and swelling improved. \par \par She is here from rehab with his daughter who is translating.

## 2021-11-18 NOTE — PHYSICAL EXAM
[Respiratory Effort] : normal respiratory effort [Normal Heart Sounds] : normal heart sounds [2+] : left 2+ [Ankle Swelling (On Exam)] : not present [Varicose Veins Of Lower Extremities] : not present [] : not present [de-identified] : in wheelchair [FreeTextEntry1] : No RUE swelling, no palpable cords or nodules, no skin changes or erythema

## 2021-12-22 PROCEDURE — P9016: CPT

## 2021-12-22 PROCEDURE — 83540 ASSAY OF IRON: CPT

## 2021-12-22 PROCEDURE — 86769 SARS-COV-2 COVID-19 ANTIBODY: CPT

## 2021-12-22 PROCEDURE — 83735 ASSAY OF MAGNESIUM: CPT

## 2021-12-22 PROCEDURE — 72125 CT NECK SPINE W/O DYE: CPT

## 2021-12-22 PROCEDURE — 84100 ASSAY OF PHOSPHORUS: CPT

## 2021-12-22 PROCEDURE — 97161 PT EVAL LOW COMPLEX 20 MIN: CPT

## 2021-12-22 PROCEDURE — 92610 EVALUATE SWALLOWING FUNCTION: CPT

## 2021-12-22 PROCEDURE — 86850 RBC ANTIBODY SCREEN: CPT

## 2021-12-22 PROCEDURE — 71250 CT THORAX DX C-: CPT

## 2021-12-22 PROCEDURE — 82728 ASSAY OF FERRITIN: CPT

## 2021-12-22 PROCEDURE — 86901 BLOOD TYPING SEROLOGIC RH(D): CPT

## 2021-12-22 PROCEDURE — 97530 THERAPEUTIC ACTIVITIES: CPT

## 2021-12-22 PROCEDURE — C1776: CPT

## 2021-12-22 PROCEDURE — 93971 EXTREMITY STUDY: CPT

## 2021-12-22 PROCEDURE — 80048 BASIC METABOLIC PNL TOTAL CA: CPT

## 2021-12-22 PROCEDURE — 85045 AUTOMATED RETICULOCYTE COUNT: CPT

## 2021-12-22 PROCEDURE — 85027 COMPLETE CBC AUTOMATED: CPT

## 2021-12-22 PROCEDURE — 72170 X-RAY EXAM OF PELVIS: CPT

## 2021-12-22 PROCEDURE — 99285 EMERGENCY DEPT VISIT HI MDM: CPT | Mod: 25

## 2021-12-22 PROCEDURE — 93005 ELECTROCARDIOGRAM TRACING: CPT

## 2021-12-22 PROCEDURE — 82746 ASSAY OF FOLIC ACID SERUM: CPT

## 2021-12-22 PROCEDURE — 82607 VITAMIN B-12: CPT

## 2021-12-22 PROCEDURE — 85025 COMPLETE CBC W/AUTO DIFF WBC: CPT

## 2021-12-22 PROCEDURE — 73502 X-RAY EXAM HIP UNI 2-3 VIEWS: CPT

## 2021-12-22 PROCEDURE — 86900 BLOOD TYPING SEROLOGIC ABO: CPT

## 2021-12-22 PROCEDURE — 73552 X-RAY EXAM OF FEMUR 2/>: CPT

## 2021-12-22 PROCEDURE — 97110 THERAPEUTIC EXERCISES: CPT

## 2021-12-22 PROCEDURE — 83550 IRON BINDING TEST: CPT

## 2021-12-22 PROCEDURE — 74176 CT ABD & PELVIS W/O CONTRAST: CPT

## 2021-12-22 PROCEDURE — 87635 SARS-COV-2 COVID-19 AMP PRB: CPT

## 2021-12-22 PROCEDURE — 36415 COLL VENOUS BLD VENIPUNCTURE: CPT

## 2021-12-22 PROCEDURE — 93306 TTE W/DOPPLER COMPLETE: CPT

## 2021-12-22 PROCEDURE — 88300 SURGICAL PATH GROSS: CPT

## 2021-12-22 PROCEDURE — 82570 ASSAY OF URINE CREATININE: CPT

## 2021-12-22 PROCEDURE — 71045 X-RAY EXAM CHEST 1 VIEW: CPT

## 2021-12-22 PROCEDURE — 84300 ASSAY OF URINE SODIUM: CPT

## 2021-12-22 PROCEDURE — 80053 COMPREHEN METABOLIC PANEL: CPT

## 2021-12-22 PROCEDURE — 70450 CT HEAD/BRAIN W/O DYE: CPT

## 2021-12-22 PROCEDURE — 81001 URINALYSIS AUTO W/SCOPE: CPT

## 2021-12-22 PROCEDURE — 86923 COMPATIBILITY TEST ELECTRIC: CPT

## 2021-12-22 PROCEDURE — 97116 GAIT TRAINING THERAPY: CPT

## 2021-12-22 PROCEDURE — 84443 ASSAY THYROID STIM HORMONE: CPT

## 2021-12-22 PROCEDURE — C1889: CPT

## 2021-12-22 PROCEDURE — 36430 TRANSFUSION BLD/BLD COMPNT: CPT

## 2023-11-27 NOTE — ED PROVIDER NOTE - CARDIAC, MLM
Jeffrey Estrada,     If you are due for any health screening(s) below please notify me so we can arrange them to be ordered and scheduled. Most healthy patients at your age complete them, but you are free to accept or refuse.     If you can't do it, I'll definitely understand. If you can, I'd certainly appreciate it!    All of your core healthy metrics are met.      Were here to help you quit smoking     Our records indicated that you are still smoking. One of the best things you can do for your health is to stop smoking and we are here to help.     Talk with your provider about our Smoking Cessation Program and how we can support you on your journey.                  
Normal rate, regular rhythm.  Heart sounds S1, S2.  No murmurs, rubs or gallops.
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
## 2024-10-30 NOTE — PHYSICAL THERAPY INITIAL EVALUATION ADULT - RANGE OF MOTION EXAMINATION, REHAB EVAL
Clary Harris is calling to request a refill on the following medication(s):    Medication Request:  Requested Prescriptions     Pending Prescriptions Disp Refills    rosuvastatin (CRESTOR) 5 MG tablet [Pharmacy Med Name: ROSUVASTATIN TABS 5MG] 90 tablet 3     Sig: TAKE 1 TABLET DAILY       Last Visit Date (If Applicable):  6/10/2024    Next Visit Date:    12/16/2024               left hip nt/no ROM deficits were identified

## 2025-01-21 NOTE — ED ADULT NURSE NOTE - NS ED NURSE LEVEL OF CONSCIOUSNESS AFFECT
Your Care Transitions Nurse is Negin Landeros RN and can be directly reached at [phone].             
Calm